# Patient Record
Sex: FEMALE | Race: WHITE | NOT HISPANIC OR LATINO
[De-identification: names, ages, dates, MRNs, and addresses within clinical notes are randomized per-mention and may not be internally consistent; named-entity substitution may affect disease eponyms.]

---

## 2022-05-03 ENCOUNTER — APPOINTMENT (OUTPATIENT)
Dept: ORTHOPEDIC SURGERY | Facility: CLINIC | Age: 82
End: 2022-05-03
Payer: MEDICARE

## 2022-05-03 VITALS — HEIGHT: 67 IN | BODY MASS INDEX: 27.47 KG/M2 | WEIGHT: 175 LBS

## 2022-05-03 DIAGNOSIS — Z87.898 PERSONAL HISTORY OF OTHER SPECIFIED CONDITIONS: ICD-10-CM

## 2022-05-03 DIAGNOSIS — Z95.5 PRESENCE OF CORONARY ANGIOPLASTY IMPLANT AND GRAFT: ICD-10-CM

## 2022-05-03 DIAGNOSIS — Z86.69 PERSONAL HISTORY OF OTHER DISEASES OF THE NERVOUS SYSTEM AND SENSE ORGANS: ICD-10-CM

## 2022-05-03 DIAGNOSIS — I25.10 ATHEROSCLEROTIC HEART DISEASE OF NATIVE CORONARY ARTERY W/OUT ANGINA PECTORIS: ICD-10-CM

## 2022-05-03 DIAGNOSIS — M25.559 PAIN IN UNSPECIFIED HIP: ICD-10-CM

## 2022-05-03 PROBLEM — Z00.00 ENCOUNTER FOR PREVENTIVE HEALTH EXAMINATION: Status: ACTIVE | Noted: 2022-05-03

## 2022-05-03 PROCEDURE — 99215 OFFICE O/P EST HI 40 MIN: CPT

## 2022-05-03 PROCEDURE — 72170 X-RAY EXAM OF PELVIS: CPT

## 2022-05-03 NOTE — HISTORY OF PRESENT ILLNESS
[Gradual] : gradual [Dull/Aching] : dull/aching [Constant] : constant [Household chores] : household chores [Leisure] : leisure [Sleep] : sleep [Social interactions] : social interactions [Nothing helps with pain getting better] : Nothing helps with pain getting better [Walking] : walking [de-identified] : 05/03/2022: 82 year F with b/l knee pain, right is worse than the left. She was previously treated by Dr. Chau with CSI, multiple rounds of HA injections, PT,HEP and medications. Treatment provided mild temporary relief. Pain is affecting her ADL and ability to walk as she is now ambulating with a wheelchair. Interested in TKA. \par  [] : no [FreeTextEntry1] : bilateral knees [FreeTextEntry5] : caregiver states pt is not new:\par \par NOTES FROM 09/2020: Patient returns for follow up B knees. Last seen in 2017 for B knee OA. She has persistent pain in her knees. She has had repeated falls due to her pain/weakness. She is using a walker. She had CSI with Dr. Arevalo 4 weeks ago with mild relief. Seeing a neurologist for her leg neuropathy. [de-identified] : 10/2020 [de-identified] : Dr. Chau [de-identified] : 2020

## 2022-05-03 NOTE — IMAGING
[de-identified] : ambulation with wheelchair\par right knee: 7-85, valgus, med and lat tenderness, mild effusion, +2 pitting edema, +1 pulse\par left knee: 7-105, varus, med and lat tenderness, mild effusion, +2 pitting edema, +1 pulse  \par \par XR pelvis- unremarkable

## 2022-05-03 NOTE — ASSESSMENT
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 I feel she is at a significant risk and explained this with her family present due to cardiac history,and neuro risk as well as memory loss. Will see cardio and neuro in the interterm and build strength with PT. Risks and benefits discussed and all questions answered\par we will plan for surgery in August after she has been cleared ans has done some PT

## 2022-05-03 NOTE — DISCUSSION/SUMMARY
[de-identified] : The natural progression of Osteoarthritis was explained to the patient.  We discussed the possible treatment options from conservative to operative.  These included NSAIDS, Glucosamine and Chondrotin sulfate, and Physical Therapy as well different types of injections.  We also discussed that at some point they may progress to needed a TKA.  Information and pamphlets were given when appropriate.\par \par Patient Complains of pain in Knee with a level that often reaches greater than a 8/10. The Pain has been\par progressively worsening of his/her treatment coarse. The pain has interfered with their ADLs and worsens with\par weight bearing. On exam they often have episodes of swelling/effusion with limited ROM. Pain worsens with ROM\par passive and active and I can palpate crepitus.\par  X rays were reviewed with the patient and they show joint space narrowing, subchondral sclerosis,\par osteophyte formation, and subchondral cysts.\par  After a period of more than 12 weeks physical therapy or exercise program done with me or another\par treating physician they have continued pain. The patient has failed a trial of NSAID medication or pain relieves if\par they were unable to tolerate NSAID medications as well as a series of injection, steroid or Hyaluronic Acid. After a\par long discussion with the patient both the patient and I have decided we have exhausted all forms of less radical\par treatments and they would like to proceed with Total Knee Replacement\par \par We discussed my findings and the natural history of their condition.  We talked about the details of the proposed surgery and the recovery.  We discussed the material risks, possible benefits and alternatives to surgery.  The risks include but are not limited to infection, bleeding and possible need for blood transfusion, fracture, bowel blockage, bladder retention or infection, need for reoperation, stiffness and/or limited range of motion, possible damage to nerves and blood vessels, failure of fixation of components, risk of deep vein thromboses and pulmonary embolism, wound healing problems, dislocation, and possible leg length discrepancy.  Although incredibly rare, we also discussed the risks of a cardiac event, stroke and even death during, or following, the surgery.  We discussed the type of implants the patient will be receiving and the type of fixation that will be used, as well as whether a robot or computer navigation aide will be used.  The patient understands they will need medical clearance and will attend a preoperative joint education class.  We also discussed the type of anesthesia they will receive, and the risks associated with hospital or rehab length of stay, obesity, diabetes and smoking.\par

## 2022-06-22 ENCOUNTER — OUTPATIENT (OUTPATIENT)
Dept: OUTPATIENT SERVICES | Facility: HOSPITAL | Age: 82
LOS: 1 days | End: 2022-06-22
Payer: MEDICARE

## 2022-06-22 ENCOUNTER — APPOINTMENT (OUTPATIENT)
Dept: CV DIAGNOSTICS | Facility: HOSPITAL | Age: 82
End: 2022-06-22

## 2022-06-22 ENCOUNTER — TRANSCRIPTION ENCOUNTER (OUTPATIENT)
Age: 82
End: 2022-06-22

## 2022-06-22 DIAGNOSIS — Z98.89 OTHER SPECIFIED POSTPROCEDURAL STATES: Chronic | ICD-10-CM

## 2022-06-22 DIAGNOSIS — I25.10 ATHEROSCLEROTIC HEART DISEASE OF NATIVE CORONARY ARTERY WITHOUT ANGINA PECTORIS: ICD-10-CM

## 2022-06-22 PROCEDURE — 93017 CV STRESS TEST TRACING ONLY: CPT

## 2022-06-22 PROCEDURE — 93018 CV STRESS TEST I&R ONLY: CPT

## 2022-06-22 PROCEDURE — A9500: CPT

## 2022-06-22 PROCEDURE — 78452 HT MUSCLE IMAGE SPECT MULT: CPT | Mod: 26,MH

## 2022-06-22 PROCEDURE — 93016 CV STRESS TEST SUPVJ ONLY: CPT

## 2022-06-22 PROCEDURE — 78452 HT MUSCLE IMAGE SPECT MULT: CPT | Mod: MH

## 2022-06-22 PROCEDURE — A9505: CPT

## 2022-06-24 ENCOUNTER — APPOINTMENT (OUTPATIENT)
Dept: CV DIAGNOSTICS | Facility: HOSPITAL | Age: 82
End: 2022-06-24

## 2022-06-27 ENCOUNTER — OUTPATIENT (OUTPATIENT)
Dept: OUTPATIENT SERVICES | Facility: HOSPITAL | Age: 82
LOS: 1 days | Discharge: HOME | End: 2022-06-27

## 2022-06-27 VITALS
HEIGHT: 67 IN | SYSTOLIC BLOOD PRESSURE: 120 MMHG | OXYGEN SATURATION: 98 % | HEART RATE: 90 BPM | TEMPERATURE: 98 F | DIASTOLIC BLOOD PRESSURE: 73 MMHG | RESPIRATION RATE: 18 BRPM | WEIGHT: 158.95 LBS

## 2022-06-27 VITALS — WEIGHT: 175.05 LBS | HEIGHT: 67 IN

## 2022-06-27 DIAGNOSIS — Z01.818 ENCOUNTER FOR OTHER PREPROCEDURAL EXAMINATION: ICD-10-CM

## 2022-06-27 DIAGNOSIS — Z98.890 OTHER SPECIFIED POSTPROCEDURAL STATES: Chronic | ICD-10-CM

## 2022-06-27 DIAGNOSIS — Z98.89 OTHER SPECIFIED POSTPROCEDURAL STATES: Chronic | ICD-10-CM

## 2022-06-27 DIAGNOSIS — Z29.9 ENCOUNTER FOR PROPHYLACTIC MEASURES, UNSPECIFIED: ICD-10-CM

## 2022-06-27 DIAGNOSIS — I25.10 ATHEROSCLEROTIC HEART DISEASE OF NATIVE CORONARY ARTERY WITHOUT ANGINA PECTORIS: ICD-10-CM

## 2022-06-27 DIAGNOSIS — M17.11 UNILATERAL PRIMARY OSTEOARTHRITIS, RIGHT KNEE: ICD-10-CM

## 2022-06-27 DIAGNOSIS — I10 ESSENTIAL (PRIMARY) HYPERTENSION: ICD-10-CM

## 2022-06-27 LAB
A1C WITH ESTIMATED AVERAGE GLUCOSE RESULT: 5.8 % — HIGH (ref 4–5.6)
ALBUMIN SERPL ELPH-MCNC: 3.4 G/DL — SIGNIFICANT CHANGE UP (ref 3.3–5)
ALP SERPL-CCNC: 109 U/L — SIGNIFICANT CHANGE UP (ref 40–120)
ALT FLD-CCNC: 17 U/L — SIGNIFICANT CHANGE UP (ref 12–78)
ANION GAP SERPL CALC-SCNC: 4 MMOL/L — LOW (ref 5–17)
APTT BLD: 29.3 SEC — SIGNIFICANT CHANGE UP (ref 27.5–35.5)
AST SERPL-CCNC: 18 U/L — SIGNIFICANT CHANGE UP (ref 15–37)
BASOPHILS # BLD AUTO: 0.03 K/UL — SIGNIFICANT CHANGE UP (ref 0–0.2)
BASOPHILS NFR BLD AUTO: 0.3 % — SIGNIFICANT CHANGE UP (ref 0–2)
BILIRUB SERPL-MCNC: 0.8 MG/DL — SIGNIFICANT CHANGE UP (ref 0.2–1.2)
BLD GP AB SCN SERPL QL: SIGNIFICANT CHANGE UP
BUN SERPL-MCNC: 9 MG/DL — SIGNIFICANT CHANGE UP (ref 7–23)
CALCIUM SERPL-MCNC: 9.2 MG/DL — SIGNIFICANT CHANGE UP (ref 8.5–10.1)
CHLORIDE SERPL-SCNC: 108 MMOL/L — SIGNIFICANT CHANGE UP (ref 96–108)
CO2 SERPL-SCNC: 33 MMOL/L — HIGH (ref 22–31)
CREAT SERPL-MCNC: 0.84 MG/DL — SIGNIFICANT CHANGE UP (ref 0.5–1.3)
EGFR: 70 ML/MIN/1.73M2 — SIGNIFICANT CHANGE UP
EOSINOPHIL # BLD AUTO: 0.05 K/UL — SIGNIFICANT CHANGE UP (ref 0–0.5)
EOSINOPHIL NFR BLD AUTO: 0.5 % — SIGNIFICANT CHANGE UP (ref 0–6)
ESTIMATED AVERAGE GLUCOSE: 120 MG/DL — HIGH (ref 68–114)
GLUCOSE SERPL-MCNC: 104 MG/DL — HIGH (ref 70–99)
HCT VFR BLD CALC: 45.4 % — HIGH (ref 34.5–45)
HGB BLD-MCNC: 14.6 G/DL — SIGNIFICANT CHANGE UP (ref 11.5–15.5)
IMM GRANULOCYTES NFR BLD AUTO: 0.2 % — SIGNIFICANT CHANGE UP (ref 0–1.5)
INR BLD: 0.98 RATIO — SIGNIFICANT CHANGE UP (ref 0.88–1.16)
LYMPHOCYTES # BLD AUTO: 1.68 K/UL — SIGNIFICANT CHANGE UP (ref 1–3.3)
LYMPHOCYTES # BLD AUTO: 17.9 % — SIGNIFICANT CHANGE UP (ref 13–44)
MCHC RBC-ENTMCNC: 30.3 PG — SIGNIFICANT CHANGE UP (ref 27–34)
MCHC RBC-ENTMCNC: 32.2 G/DL — SIGNIFICANT CHANGE UP (ref 32–36)
MCV RBC AUTO: 94.2 FL — SIGNIFICANT CHANGE UP (ref 80–100)
MONOCYTES # BLD AUTO: 0.74 K/UL — SIGNIFICANT CHANGE UP (ref 0–0.9)
MONOCYTES NFR BLD AUTO: 7.9 % — SIGNIFICANT CHANGE UP (ref 2–14)
NEUTROPHILS # BLD AUTO: 6.86 K/UL — SIGNIFICANT CHANGE UP (ref 1.8–7.4)
NEUTROPHILS NFR BLD AUTO: 73.2 % — SIGNIFICANT CHANGE UP (ref 43–77)
NRBC # BLD: 0 /100 WBCS — SIGNIFICANT CHANGE UP (ref 0–0)
PLATELET # BLD AUTO: 332 K/UL — SIGNIFICANT CHANGE UP (ref 150–400)
POTASSIUM SERPL-MCNC: 3.7 MMOL/L — SIGNIFICANT CHANGE UP (ref 3.5–5.3)
POTASSIUM SERPL-SCNC: 3.7 MMOL/L — SIGNIFICANT CHANGE UP (ref 3.5–5.3)
PROT SERPL-MCNC: 7.4 GM/DL — SIGNIFICANT CHANGE UP (ref 6–8.3)
PROTHROM AB SERPL-ACNC: 11.8 SEC — SIGNIFICANT CHANGE UP (ref 10.5–13.4)
RBC # BLD: 4.82 M/UL — SIGNIFICANT CHANGE UP (ref 3.8–5.2)
RBC # FLD: 13.6 % — SIGNIFICANT CHANGE UP (ref 10.3–14.5)
SODIUM SERPL-SCNC: 145 MMOL/L — SIGNIFICANT CHANGE UP (ref 135–145)
VIT D25+D1,25 OH+D1,25 PNL SERPL-MCNC: 51.4 PG/ML — SIGNIFICANT CHANGE UP (ref 19.9–79.3)
WBC # BLD: 9.38 K/UL — SIGNIFICANT CHANGE UP (ref 3.8–10.5)
WBC # FLD AUTO: 9.38 K/UL — SIGNIFICANT CHANGE UP (ref 3.8–10.5)

## 2022-06-27 NOTE — PHYSICAL THERAPY INITIAL EVALUATION ADULT - DISCHARGE DISPOSITION, PT EVAL
Patient prefers Short Term Rehab, pending functional status post-operatively. Will require rolling walker. Owns rollator, 3:1 commode, recliner chair, transport chair, and shower chair.

## 2022-06-27 NOTE — H&P PST ADULT - ASSESSMENT
CHRISTOPHERI VTE 2.0 SCORE [CLOT updated 2019]    AGE RELATED RISK FACTORS                                                       MOBILITY RELATED FACTORS  [ ] Age 41-60 years                                            (1 Point)                    [ ] Bed rest                                                        (1 Point)  [ ] Age: 61-74 years                                           (2 Points)                  [ ] Plaster cast                                                   (2 Points)  [x ] Age= 75 years                                              (3 Points)                    [ ] Bed bound for more than 72 hours                 (2 Points)    DISEASE RELATED RISK FACTORS                                               GENDER SPECIFIC FACTORS  [ ] Edema in the lower extremities                       (1 Point)              [ ] Pregnancy                                                     (1 Point)  [ ] Varicose veins                                               (1 Point)                     [ ] Post-partum < 6 weeks                                   (1 Point)             [ ] BMI > 25 Kg/m2                                            (1 Point)                     [ ] Hormonal therapy  or oral contraception          (1 Point)                 [ ] Sepsis (in the previous month)                        (1 Point)               [ ] History of pregnancy complications                 (1 point)  [ ] Pneumonia or serious lung disease                                               [ ] Unexplained or recurrent                     (1 Point)           (in the previous month)                               (1 Point)  [ ] Abnormal pulmonary function test                     (1 Point)                 SURGERY RELATED RISK FACTORS  [ ] Acute myocardial infarction                              (1 Point)               [ ]  Section                                             (1 Point)  [ ] Congestive heart failure (in the previous month)  (1 Point)      [ ] Minor surgery                                                  (1 Point)   [ ] Inflammatory bowel disease                             (1 Point)               [ ] Arthroscopic surgery                                        (2 Points)  [ ] Central venous access                                      (2 Points)                [ ] General surgery lasting more than 45 minutes (2 points)  [ ] Malignancy- Present or previous                   (2 Points)                [ x] Elective arthroplasty                                         (5 points)    [ ] Stroke (in the previous month)                          (5 Points)                                                                                                                                                           HEMATOLOGY RELATED FACTORS                                                 TRAUMA RELATED RISK FACTORS  [ ] Prior episodes of VTE                                     (3 Points)                [ ] Fracture of the hip, pelvis, or leg                       (5 Points)  [ ] Positive family history for VTE                         (3 Points)             [ ] Acute spinal cord injury (in the previous month)  (5 Points)  [ ] Prothrombin 99958 A                                     (3 Points)               [ ] Paralysis  (less than 1 month)                             (5 Points)  [ ] Factor V Leiden                                             (3 Points)                  [ ] Multiple Trauma within 1 month                        (5 Points)  [ ] Lupus anticoagulants                                     (3 Points)                                                           [ ] Anticardiolipin antibodies                               (3 Points)                                                       [ ] High homocysteine in the blood                      (3 Points)                                             [ ] Other congenital or acquired thrombophilia      (3 Points)                                                [ ] Heparin induced thrombocytopenia                  (3 Points)                                     Total Score [     8     ]

## 2022-06-27 NOTE — H&P PST ADULT - NSICDXPASTSURGICALHX_GEN_ALL_CORE_FT
PAST SURGICAL HISTORY:  History of cholecystectomy     S/P cardiac catheterization 2018 with CASEY    S/P tonsillectomy

## 2022-06-27 NOTE — OCCUPATIONAL THERAPY INITIAL EVALUATION ADULT - LIVES WITH, PROFILE
in a private house with 3 steps equipped with a ramp and bilateral hand rails that cannot be reached simultaneously . All living amenities are located on one level. The bathroom has a tub/shower combination, shower chair, fixed / retractable shower head and standard toilet/ 3:1commode ./spouse in a private house with 3 steps equipped with a ramp over them and bilateral hand rails that can be reached simultaneously . All living amenities are located on one level. The bathroom has a tub/shower combination, shower chair, fixed / retractable shower head and standard toilet/ 3:1commode ./spouse

## 2022-06-27 NOTE — OCCUPATIONAL THERAPY INITIAL EVALUATION ADULT - SOCIAL CONCERNS
Pt's spouse and daughter Sangeeta  voiced concerns about her recovery at home jose to limited support. Both are opting for short term rehab and intend to discuss this MD Waldemar. ./Complex psychosocial needs/coping issues Pt's spouse and daughter Sangeeta voiced concerns about her recovery at home due to limited support. Both are opting for short term rehab and intend to discuss this MD Waldemar. ./Complex psychosocial needs/coping issues

## 2022-06-27 NOTE — PHYSICAL THERAPY INITIAL EVALUATION ADULT - SINGLE LEG BALANCE TEST, REHAB EVAL
One Leg Stand Test (OLST): Right: unable Left: unable. Functional test to assess fall risk. Both gait and stair negotiation require components of OLST. Participants unable to perform the OLST for at least 5 seconds are at increased risk for injurious fall.

## 2022-06-27 NOTE — OCCUPATIONAL THERAPY INITIAL EVALUATION ADULT - PERTINENT HX OF CURRENT PROBLEM, REHAB EVAL
Pt is and 81 y/o female slated for elective surgery for right TKR with MD Medeiros on 7/14/22, due to OA, chronic pain and DJD. Pt denied any falls in the past 3-6 months

## 2022-06-27 NOTE — OCCUPATIONAL THERAPY INITIAL EVALUATION ADULT - TRANSFER SAFETY CONCERNS NOTED: BED/CHAIR, REHAB EVAL
decreased balance during turns/decreased safety awareness/squat pivot/stand pivot/decreased weight-shifting ability

## 2022-06-27 NOTE — H&P PST ADULT - NSANTHOSAYNRD_GEN_A_CORE
No. GODWIN screening performed.  STOP BANG Legend: 0-2 = LOW Risk; 3-4 = INTERMEDIATE Risk; 5-8 = HIGH Risk

## 2022-06-27 NOTE — PHYSICAL THERAPY INITIAL EVALUATION ADULT - TIMED UP AND GO TEST, REHAB EVAL
>2 minutes with rolling walker. (An older adult who takes =12 seconds to complete the TUG is at risk for falling, per CDC’s STEADI tools, 2017)

## 2022-06-27 NOTE — OCCUPATIONAL THERAPY INITIAL EVALUATION ADULT - ADDITIONAL COMMENTS
At this time is functioning in her roles, & ambulating independently for short household distance to the bathroom. Pt's spouse assist her with bathing, dressing shopping cooking and cleaning. Pt c/o 5/10 pain in her right knee at rest and 10/10. The pain is exacerbated, by walking, prolonged sitting, positioning,  jolts when her cats and dogs jump on her; pain is relieved with heat and hydrocodone 1/2 twice daily. Pt is unable to make a composite on each hand due to arthritic changes; as a resolut dexterity and fine motor skills are diminished.  Pt is currently receiving outpatient PT intervention 2x weekly. Pt is right hand dominant and wears glasses for reading. Pt scores 60% of patient specific scale At this time is functioning in her roles, & ambulating independently for short household distance to the bathroom. Pt's spouse assist her with bathing, dressing shopping cooking and cleaning. Pt c/o 5/10 pain in her right knee at rest and 10/10. The pain is exacerbated, by walking, prolonged sitting, positioning,  jolts when her cats and dogs jump on her; pain is relieved with heat and hydrocodone 1/2 twice daily. Pt is unable to make a composite on each hand due to arthritic changes; as a result ,dexterity and fine motor skills are diminished.  Pt is currently receiving outpatient PT intervention 2x weekly. Pt is right hand dominant and wears glasses for reading. Pt scores 60% of patient specific scale

## 2022-06-27 NOTE — OCCUPATIONAL THERAPY INITIAL EVALUATION ADULT - GENERAL OBSERVATIONS, REHAB EVAL
Chart reviewed. Patient encountered seated in transport wheel chair in rehab preop room in [facility]. Pt is accompanied by her  and daughter Sangeeta. Patient underwent occupational therapy pre-operative consultation to determine current functional ADL limitations in order to provide the right equipment for patient to perform functional ADL post operation.

## 2022-06-27 NOTE — PHYSICAL THERAPY INITIAL EVALUATION ADULT - ADDITIONAL COMMENTS
Home set-up: lives with spouse in private house with 3 stair steps with bilateral handrails, and a ramp to enter at front. Bedroom and bathroom at same level. Bathroom is a shower-tub combination with grab rails, a standard height toilet seat, with fixed shower head. Endorses will be supported by  post-op. Patient is currently using rollator with mobility. Reports owns a rollator, commode, recliner chair and transport chair. Patient endorses typical pain at best is 5/10, at worst is 10/10. Per patient, pain is worse with positioning. Pain is relieved by taking hydrocodone once a day; rests or use of heat. Patient is (R)-handed and no longer drives; wears glasses always. Patient denies falls in past 6 months. Denies buckling.

## 2022-06-27 NOTE — OCCUPATIONAL THERAPY INITIAL EVALUATION ADULT - RANGE OF MOTION EXAMINATION, LOWER EXTREMITY
ROM is limited in right knee due to pain . ROM in right knee is -25 -90/bilateral LE Passive ROM was WFL  (within functional limits)/bilateral LE Active Assistive ROM was WFL  (within functional limits)

## 2022-06-27 NOTE — OCCUPATIONAL THERAPY INITIAL EVALUATION ADULT - ANTICIPATED DISCHARGE DISPOSITION, OT EVAL
Recommend home with OT referral to enable patient to safely perform ADL management and functional mobility.  Pt's  and ashley Rutherford expressed preferences for pt to go to [facility] due to limited support at home. Pt owns a rollator 3-in-1 commode, rolling walker and shower chair.

## 2022-06-27 NOTE — OCCUPATIONAL THERAPY INITIAL EVALUATION ADULT - PRECAUTIONS/LIMITATIONS, REHAB EVAL
Pt has a history of multiple comorbidities and diminished reaction time due to age related changes . Pt's mobility and ADL management is limited due to pain in right knee. Pt is unable to bend right knee and her gait is unsteady./cardiac precautions/fall precautions/obesity precautions

## 2022-06-27 NOTE — H&P PST ADULT - PROBLEM SELECTOR PLAN 2
brilinta pre op instruction per cardiologist , daughter will call cardiologist for instructions  pt already seen cardiologist for clearance, will obtain the report brilinta pre op instruction per cardiologist , daughter will call cardiologist for instructions  pt already seen cardiologist for clearance, will obtain the report  stress test report 6/2022 in chart

## 2022-06-27 NOTE — OCCUPATIONAL THERAPY INITIAL EVALUATION ADULT - ASSISTIVE DEVICE FOR TOILET TRANSFER, REHAB EVAL
February 19, 2020      Jeannine Brizuela PA-C  [address] [facility] [address]           [facility] [address] [place] [address]  Phone: [phone]          Patient: Gus Recinos   MR Number: [id number]   YOB: 2007   Date of Visit: 2/19/2020       Dear Jeannine Brizuela:    Thank you for referring Gus Recinos to me for evaluation. Attached you will find relevant portions of my assessment and plan of care.    If you have questions, please do not hesitate to call me. I look forward to following Gus Recinos along with you.    Sincerely,    Gabriel Antonio MD    Enclosure  CC:  No Recipients    If you would like to receive this communication electronically, please contact [email or web address] or [phone] to request more information on [employer] access.    For providers and/or their staff who would like to refer a patient to [facility], please contact us through our one-stop-shop provider referral line, [facility], at [phone].    If you feel you have received this communication in error or would no longer like to receive these types of communications, please e-mail [email or web address]          commode/rolling walker

## 2022-06-27 NOTE — PHYSICAL THERAPY INITIAL EVALUATION ADULT - PERTINENT HX OF CURRENT PROBLEM, REHAB EVAL
Patient attends Pre-Op Testing today following consult with Dr. Medeiros due to chronic pain to (R) knee. Significant surgical/medical histories of cardiac stents and (R) rotator cuff repair. Elective  RTKA is now scheduled in this facility for 7/14/22.

## 2022-06-27 NOTE — H&P PST ADULT - HISTORY OF PRESENT ILLNESS
This is a 81 y/o female with PMH of CAD, with CASEY on brilinta, HTN, HLD, GERD, dementia, glaucoma , c/o both knee pain, right worse than left , associated with difficulty walking, she presents today for right total knee arthroplasty on 7/14/22 This is a 83 y/o female with PMH of CAD, with CASEY on brilinta, HTN, HLD, GERD, dementia, glaucoma , c/o both knee pain, right worse than left , associated with difficulty walking, she presents today for right total knee arthroplasty on 7/14/22  covid swab to be done 7/11/22 , daughter will make appointment for pt   denies recent travel or covid infections This is a 83 y/o female with PMH of CAD, with CASEY on brilinta, HTN, HLD, GERD, dementia, glaucoma , c/o both knee pain, right worse than left , associated with difficulty walking, she presents today for right total knee arthroplasty on 7/14/22  covid swab to be done 7/11/22 , daughter will make appointment for pt   denies recent travel or covid infections  goals: pain free, walk better

## 2022-06-27 NOTE — PHYSICAL THERAPY INITIAL EVALUATION ADULT - GAIT DEVIATIONS NOTED, PT EVAL
antalgic; (R) knee locked in extension during gait/decreased ebenezer/increased time in double stance/decreased step length/decreased stride length/decreased weight-shifting ability

## 2022-06-27 NOTE — OCCUPATIONAL THERAPY INITIAL EVALUATION ADULT - RANGE OF MOTION EXAMINATION, UPPER EXTREMITY
ROM in right shoulder 0-100, left shoulder 0-100/bilateral UE Passive ROM was WFL  (within functional limits)/bilateral UE Active Assistive ROM was WFL  (within functional limits)

## 2022-06-27 NOTE — PHYSICAL THERAPY INITIAL EVALUATION ADULT - GENERAL OBSERVATIONS, REHAB EVAL
Patient encountered sitting in PST waiting area, agreeable to PT pre-op evaluation. Patient is for elective (R) total knee arthroplasty at a later date from now. Tolerated all aspects of evaluation without undue events, please see further PT documentation for details.

## 2022-06-27 NOTE — H&P PST ADULT - NSICDXPASTMEDICALHX_GEN_ALL_CORE_FT
PAST MEDICAL HISTORY:  Anxiety and depression     Arthritis     CAD (coronary artery disease)     Cataract     Dementia     GERD (gastroesophageal reflux disease)     Glaucoma     HTN (hypertension)     Hyperlipidemia     Mitral valve prolapse     Pancreatitis     Polymyalgia rheumatica

## 2022-06-28 ENCOUNTER — APPOINTMENT (OUTPATIENT)
Dept: ORTHOPEDIC SURGERY | Facility: CLINIC | Age: 82
End: 2022-06-28
Payer: MEDICARE

## 2022-06-28 VITALS — WEIGHT: 175 LBS | BODY MASS INDEX: 27.47 KG/M2 | HEIGHT: 67 IN

## 2022-06-28 DIAGNOSIS — M17.12 UNILATERAL PRIMARY OSTEOARTHRITIS, LEFT KNEE: ICD-10-CM

## 2022-06-28 LAB
MRSA PCR RESULT.: SIGNIFICANT CHANGE UP
S AUREUS DNA NOSE QL NAA+PROBE: SIGNIFICANT CHANGE UP

## 2022-06-28 PROCEDURE — 99213 OFFICE O/P EST LOW 20 MIN: CPT

## 2022-06-28 NOTE — ASSESSMENT
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 I feel she is at a significant risk and explained this with her family present due to cardiac history,and neuro risk as well as memory loss. Will see cardio and neuro in the interterm and build strength with PT. Risks and benefits discussed and all questions answered\par we will plan for surgery in August after she has been cleared ans has done some PT \par \par 6/28/22: continued pain in b/l knees - She is scheduled for R TKA 7/14/22. Has completed preop testing. All questions answered and risks and benefits discussed. Again discussed issues with neuro and ambulation status with patient and her family and they are aware and understand.

## 2022-06-28 NOTE — HISTORY OF PRESENT ILLNESS
[Gradual] : gradual [Dull/Aching] : dull/aching [Constant] : constant [Household chores] : household chores [Leisure] : leisure [Sleep] : sleep [Social interactions] : social interactions [Nothing helps with pain getting better] : Nothing helps with pain getting better [Walking] : walking [de-identified] : 6/28/22: continued and worsening pain in b/l knees. Scheduled for R TKA 7/14/22\par \par Prev doc;\par 05/03/2022: 82 year F with b/l knee pain, right is worse than the left. She was previously treated by Dr. Chau with CSI, multiple rounds of HA injections, PT,HEP and medications. Treatment provided mild temporary relief. Pain is affecting her ADL and ability to walk as she is now ambulating with a wheelchair. Interested in TKA. \par  [] : no [FreeTextEntry1] : bilateral knees [FreeTextEntry5] : NOTES FROM 09/2020: Patient returns for follow up B knees. Last seen in 2017 for B knee OA. She has persistent pain in her knees. She has had repeated falls due to her pain/weakness. She is using a walker. She had CSI with Dr. Arevalo 4 weeks ago with mild relief. Seeing a neurologist for her leg neuropathy.\par \par 06/28/22: pt reports pain is the same since last visit. \par pt had pre-surgical testing yesterday 06/27/22 and nuclear stress test  [de-identified] : 10/2020 [de-identified] : Dr. Chau

## 2022-06-28 NOTE — DISCUSSION/SUMMARY
[de-identified] : The natural progression of Osteoarthritis was explained to the patient.  We discussed the possible treatment options from conservative to operative.  These included NSAIDS, Glucosamine and Chondrotin sulfate, and Physical Therapy as well different types of injections.  We also discussed that at some point they may progress to needed a TKA.  Information and pamphlets were given when appropriate.\par \par Patient Complains of pain in Knee with a level that often reaches greater than a 8/10. The Pain has been\par progressively worsening of his/her treatment coarse. The pain has interfered with their ADLs and worsens with\par weight bearing. On exam they often have episodes of swelling/effusion with limited ROM. Pain worsens with ROM\par passive and active and I can palpate crepitus.\par  X rays were reviewed with the patient and they show joint space narrowing, subchondral sclerosis,\par osteophyte formation, and subchondral cysts.\par  After a period of more than 12 weeks physical therapy or exercise program done with me or another\par treating physician they have continued pain. The patient has failed a trial of NSAID medication or pain relieves if\par they were unable to tolerate NSAID medications as well as a series of injection, steroid or Hyaluronic Acid. After a\par long discussion with the patient both the patient and I have decided we have exhausted all forms of less radical\par treatments and they would like to proceed with Total Knee Replacement\par \par We discussed my findings and the natural history of their condition.  We talked about the details of the proposed surgery and the recovery.  We discussed the material risks, possible benefits and alternatives to surgery.  The risks include but are not limited to infection, bleeding and possible need for blood transfusion, fracture, bowel blockage, bladder retention or infection, need for reoperation, stiffness and/or limited range of motion, possible damage to nerves and blood vessels, failure of fixation of components, risk of deep vein thromboses and pulmonary embolism, wound healing problems, dislocation, and possible leg length discrepancy.  Although incredibly rare, we also discussed the risks of a cardiac event, stroke and even death during, or following, the surgery.  We discussed the type of implants the patient will be receiving and the type of fixation that will be used, as well as whether a robot or computer navigation aide will be used.  The patient understands they will need medical clearance and will attend a preoperative joint education class.  We also discussed the type of anesthesia they will receive, and the risks associated with hospital or rehab length of stay, obesity, diabetes and smoking.\par \par Entered by Haritha Taylor acting as scribe.

## 2022-06-28 NOTE — IMAGING
[de-identified] : ambulation with wheelchair\par right knee: 7-85, valgus, med and lat tenderness, mild effusion, +2 pitting edema, +1 pulse\par left knee: 7-105, varus, med and lat tenderness, mild effusion, +2 pitting edema, +1 pulse  \par \par

## 2022-07-13 ENCOUNTER — FORM ENCOUNTER (OUTPATIENT)
Age: 82
End: 2022-07-13

## 2022-07-14 ENCOUNTER — TRANSCRIPTION ENCOUNTER (OUTPATIENT)
Age: 82
End: 2022-07-14

## 2022-07-14 ENCOUNTER — OUTPATIENT (OUTPATIENT)
Dept: OUTPATIENT SERVICES | Facility: HOSPITAL | Age: 82
LOS: 1 days | Discharge: HOME | End: 2022-07-14

## 2022-07-14 ENCOUNTER — APPOINTMENT (OUTPATIENT)
Dept: ORTHOPEDIC SURGERY | Facility: HOSPITAL | Age: 82
End: 2022-07-14

## 2022-07-14 DIAGNOSIS — F02.80 DEMENTIA IN OTHER DISEASES CLASSIFIED ELSEWHERE, UNSPECIFIED SEVERITY, WITHOUT BEHAVIORAL DISTURBANCE, PSYCHOTIC DISTURBANCE, MOOD DISTURBANCE, AND ANXIETY: ICD-10-CM

## 2022-07-14 DIAGNOSIS — I25.10 ATHEROSCLEROTIC HEART DISEASE OF NATIVE CORONARY ARTERY WITHOUT ANGINA PECTORIS: ICD-10-CM

## 2022-07-14 DIAGNOSIS — M17.11 UNILATERAL PRIMARY OSTEOARTHRITIS, RIGHT KNEE: ICD-10-CM

## 2022-07-14 DIAGNOSIS — F41.9 ANXIETY DISORDER, UNSPECIFIED: ICD-10-CM

## 2022-07-14 DIAGNOSIS — I12.9 HYPERTENSIVE CHRONIC KIDNEY DISEASE WITH STAGE 1 THROUGH STAGE 4 CHRONIC KIDNEY DISEASE, OR UNSPECIFIED CHRONIC KIDNEY DISEASE: ICD-10-CM

## 2022-07-14 DIAGNOSIS — Z90.49 ACQUIRED ABSENCE OF OTHER SPECIFIED PARTS OF DIGESTIVE TRACT: ICD-10-CM

## 2022-07-14 DIAGNOSIS — Z79.82 LONG TERM (CURRENT) USE OF ASPIRIN: ICD-10-CM

## 2022-07-14 DIAGNOSIS — Z95.5 PRESENCE OF CORONARY ANGIOPLASTY IMPLANT AND GRAFT: ICD-10-CM

## 2022-07-14 DIAGNOSIS — I34.1 NONRHEUMATIC MITRAL (VALVE) PROLAPSE: ICD-10-CM

## 2022-07-14 DIAGNOSIS — Z98.89 OTHER SPECIFIED POSTPROCEDURAL STATES: Chronic | ICD-10-CM

## 2022-07-14 DIAGNOSIS — J44.9 CHRONIC OBSTRUCTIVE PULMONARY DISEASE, UNSPECIFIED: ICD-10-CM

## 2022-07-14 DIAGNOSIS — Z88.0 ALLERGY STATUS TO PENICILLIN: ICD-10-CM

## 2022-07-14 DIAGNOSIS — G30.9 ALZHEIMER'S DISEASE, UNSPECIFIED: ICD-10-CM

## 2022-07-14 DIAGNOSIS — E78.00 PURE HYPERCHOLESTEROLEMIA, UNSPECIFIED: ICD-10-CM

## 2022-07-14 DIAGNOSIS — K21.9 GASTRO-ESOPHAGEAL REFLUX DISEASE WITHOUT ESOPHAGITIS: ICD-10-CM

## 2022-07-14 DIAGNOSIS — Z98.890 OTHER SPECIFIED POSTPROCEDURAL STATES: Chronic | ICD-10-CM

## 2022-07-14 DIAGNOSIS — F32.A DEPRESSION, UNSPECIFIED: ICD-10-CM

## 2022-07-14 DIAGNOSIS — N18.2 CHRONIC KIDNEY DISEASE, STAGE 2 (MILD): ICD-10-CM

## 2022-07-14 DIAGNOSIS — M19.90 UNSPECIFIED OSTEOARTHRITIS, UNSPECIFIED SITE: ICD-10-CM

## 2022-07-14 DIAGNOSIS — M06.80 OTHER SPECIFIED RHEUMATOID ARTHRITIS, UNSPECIFIED SITE: ICD-10-CM

## 2022-07-14 PROCEDURE — 20985 CPTR-ASST DIR MS PX: CPT

## 2022-07-14 PROCEDURE — 27447 TOTAL KNEE ARTHROPLASTY: CPT | Mod: AS,RT

## 2022-07-14 PROCEDURE — 27447 TOTAL KNEE ARTHROPLASTY: CPT | Mod: RT

## 2022-07-14 RX ORDER — BETAXOLOL HCL 0.25 %
1 SUSPENSION, DROPS(FINAL DOSAGE FORM)(ML) OPHTHALMIC (EYE)
Qty: 0 | Refills: 0 | DISCHARGE

## 2022-07-14 RX ORDER — ATORVASTATIN CALCIUM 80 MG/1
1 TABLET, FILM COATED ORAL
Qty: 0 | Refills: 0 | DISCHARGE

## 2022-07-14 RX ORDER — BRINZOLAMIDE/BRIMONIDINE TARTRATE 10; 2 MG/ML; MG/ML
1 SUSPENSION/ DROPS OPHTHALMIC
Qty: 0 | Refills: 0 | DISCHARGE

## 2022-07-14 RX ORDER — DONEPEZIL HYDROCHLORIDE 10 MG/1
1 TABLET, FILM COATED ORAL
Qty: 0 | Refills: 0 | DISCHARGE

## 2022-07-14 RX ORDER — ALPRAZOLAM 0.25 MG
1 TABLET ORAL
Qty: 0 | Refills: 0 | DISCHARGE

## 2022-07-14 RX ORDER — DULOXETINE HYDROCHLORIDE 30 MG/1
1 CAPSULE, DELAYED RELEASE ORAL
Qty: 0 | Refills: 0 | DISCHARGE

## 2022-07-15 ENCOUNTER — TRANSCRIPTION ENCOUNTER (OUTPATIENT)
Age: 82
End: 2022-07-15

## 2022-07-15 RX ORDER — FOLIC ACID 0.8 MG
1 TABLET ORAL
Qty: 0 | Refills: 0 | DISCHARGE

## 2022-07-19 PROBLEM — F03.90 UNSPECIFIED DEMENTIA WITHOUT BEHAVIORAL DISTURBANCE: Chronic | Status: ACTIVE | Noted: 2022-06-27

## 2022-07-19 PROBLEM — I25.10 ATHEROSCLEROTIC HEART DISEASE OF NATIVE CORONARY ARTERY WITHOUT ANGINA PECTORIS: Chronic | Status: ACTIVE | Noted: 2022-06-27

## 2022-07-19 PROBLEM — E78.5 HYPERLIPIDEMIA, UNSPECIFIED: Chronic | Status: ACTIVE | Noted: 2022-06-27

## 2022-07-19 PROBLEM — F41.9 ANXIETY DISORDER, UNSPECIFIED: Chronic | Status: ACTIVE | Noted: 2022-06-27

## 2022-07-25 ENCOUNTER — APPOINTMENT (OUTPATIENT)
Dept: ORTHOPEDIC SURGERY | Facility: CLINIC | Age: 82
End: 2022-07-25

## 2022-08-04 ENCOUNTER — INPATIENT (INPATIENT)
Facility: HOSPITAL | Age: 82
LOS: 4 days | Discharge: HOME HEALTH | End: 2022-08-09
Attending: FAMILY MEDICINE | Admitting: FAMILY MEDICINE

## 2022-08-04 VITALS
WEIGHT: 139.99 LBS | OXYGEN SATURATION: 96 % | DIASTOLIC BLOOD PRESSURE: 57 MMHG | TEMPERATURE: 98 F | HEIGHT: 67 IN | RESPIRATION RATE: 18 BRPM | HEART RATE: 100 BPM | SYSTOLIC BLOOD PRESSURE: 77 MMHG

## 2022-08-04 DIAGNOSIS — Z98.89 OTHER SPECIFIED POSTPROCEDURAL STATES: Chronic | ICD-10-CM

## 2022-08-04 DIAGNOSIS — Z98.890 OTHER SPECIFIED POSTPROCEDURAL STATES: Chronic | ICD-10-CM

## 2022-08-04 LAB
ALBUMIN SERPL ELPH-MCNC: 2.9 G/DL — LOW (ref 3.3–5)
ALP SERPL-CCNC: 136 U/L — HIGH (ref 40–120)
ALT FLD-CCNC: 25 U/L — SIGNIFICANT CHANGE UP (ref 12–78)
ANION GAP SERPL CALC-SCNC: 9 MMOL/L — SIGNIFICANT CHANGE UP (ref 5–17)
APPEARANCE UR: ABNORMAL
APTT BLD: 28 SEC — SIGNIFICANT CHANGE UP (ref 27.5–35.5)
AST SERPL-CCNC: 25 U/L — SIGNIFICANT CHANGE UP (ref 15–37)
BACTERIA # UR AUTO: ABNORMAL
BASOPHILS # BLD AUTO: 0.05 K/UL — SIGNIFICANT CHANGE UP (ref 0–0.2)
BASOPHILS NFR BLD AUTO: 0.4 % — SIGNIFICANT CHANGE UP (ref 0–2)
BILIRUB SERPL-MCNC: 0.9 MG/DL — SIGNIFICANT CHANGE UP (ref 0.2–1.2)
BILIRUB UR-MCNC: NEGATIVE — SIGNIFICANT CHANGE UP
BUN SERPL-MCNC: 39 MG/DL — HIGH (ref 7–23)
CALCIUM SERPL-MCNC: 9 MG/DL — SIGNIFICANT CHANGE UP (ref 8.5–10.1)
CHLORIDE SERPL-SCNC: 101 MMOL/L — SIGNIFICANT CHANGE UP (ref 96–108)
CO2 SERPL-SCNC: 30 MMOL/L — SIGNIFICANT CHANGE UP (ref 22–31)
COLOR SPEC: YELLOW — SIGNIFICANT CHANGE UP
CREAT SERPL-MCNC: 1.59 MG/DL — HIGH (ref 0.5–1.3)
D DIMER BLD IA.RAPID-MCNC: 1110 NG/ML DDU — HIGH
DIFF PNL FLD: ABNORMAL
EGFR: 33 ML/MIN/1.73M2 — LOW
EOSINOPHIL # BLD AUTO: 0.08 K/UL — SIGNIFICANT CHANGE UP (ref 0–0.5)
EOSINOPHIL NFR BLD AUTO: 0.7 % — SIGNIFICANT CHANGE UP (ref 0–6)
EPI CELLS # UR: ABNORMAL
FLUAV AG NPH QL: SIGNIFICANT CHANGE UP
FLUBV AG NPH QL: SIGNIFICANT CHANGE UP
GLUCOSE BLDC GLUCOMTR-MCNC: 137 MG/DL — HIGH (ref 70–99)
GLUCOSE SERPL-MCNC: 129 MG/DL — HIGH (ref 70–99)
GLUCOSE UR QL: NEGATIVE MG/DL — SIGNIFICANT CHANGE UP
HCT VFR BLD CALC: 32.8 % — LOW (ref 34.5–45)
HGB BLD-MCNC: 10.7 G/DL — LOW (ref 11.5–15.5)
IMM GRANULOCYTES NFR BLD AUTO: 1 % — SIGNIFICANT CHANGE UP (ref 0–1.5)
INR BLD: 0.99 RATIO — SIGNIFICANT CHANGE UP (ref 0.88–1.16)
KETONES UR-MCNC: ABNORMAL
LACTATE SERPL-SCNC: 1.3 MMOL/L — SIGNIFICANT CHANGE UP (ref 0.7–2)
LACTATE SERPL-SCNC: 5.2 MMOL/L — CRITICAL HIGH (ref 0.7–2)
LEUKOCYTE ESTERASE UR-ACNC: ABNORMAL
LYMPHOCYTES # BLD AUTO: 0.8 K/UL — LOW (ref 1–3.3)
LYMPHOCYTES # BLD AUTO: 6.8 % — LOW (ref 13–44)
MCHC RBC-ENTMCNC: 31.5 PG — SIGNIFICANT CHANGE UP (ref 27–34)
MCHC RBC-ENTMCNC: 32.6 G/DL — SIGNIFICANT CHANGE UP (ref 32–36)
MCV RBC AUTO: 96.5 FL — SIGNIFICANT CHANGE UP (ref 80–100)
MONOCYTES # BLD AUTO: 0.88 K/UL — SIGNIFICANT CHANGE UP (ref 0–0.9)
MONOCYTES NFR BLD AUTO: 7.5 % — SIGNIFICANT CHANGE UP (ref 2–14)
NEUTROPHILS # BLD AUTO: 9.86 K/UL — HIGH (ref 1.8–7.4)
NEUTROPHILS NFR BLD AUTO: 83.6 % — HIGH (ref 43–77)
NITRITE UR-MCNC: NEGATIVE — SIGNIFICANT CHANGE UP
NRBC # BLD: 0 /100 WBCS — SIGNIFICANT CHANGE UP (ref 0–0)
NT-PROBNP SERPL-SCNC: 375 PG/ML — SIGNIFICANT CHANGE UP (ref 0–450)
PH UR: 5 — SIGNIFICANT CHANGE UP (ref 5–8)
PLATELET # BLD AUTO: 923 K/UL — HIGH (ref 150–400)
POTASSIUM SERPL-MCNC: 4.1 MMOL/L — SIGNIFICANT CHANGE UP (ref 3.5–5.3)
POTASSIUM SERPL-SCNC: 4.1 MMOL/L — SIGNIFICANT CHANGE UP (ref 3.5–5.3)
PROT SERPL-MCNC: 7.8 GM/DL — SIGNIFICANT CHANGE UP (ref 6–8.3)
PROT UR-MCNC: 30 MG/DL
PROTHROM AB SERPL-ACNC: 11.8 SEC — SIGNIFICANT CHANGE UP (ref 10.5–13.4)
RBC # BLD: 3.4 M/UL — LOW (ref 3.8–5.2)
RBC # FLD: 15.7 % — HIGH (ref 10.3–14.5)
RBC CASTS # UR COMP ASSIST: SIGNIFICANT CHANGE UP /HPF (ref 0–4)
SARS-COV-2 RNA SPEC QL NAA+PROBE: SIGNIFICANT CHANGE UP
SODIUM SERPL-SCNC: 140 MMOL/L — SIGNIFICANT CHANGE UP (ref 135–145)
SP GR SPEC: 1.01 — SIGNIFICANT CHANGE UP (ref 1.01–1.02)
TROPONIN I, HIGH SENSITIVITY RESULT: 11.7 NG/L — SIGNIFICANT CHANGE UP
UROBILINOGEN FLD QL: 4 MG/DL
WBC # BLD: 11.79 K/UL — HIGH (ref 3.8–10.5)
WBC # FLD AUTO: 11.79 K/UL — HIGH (ref 3.8–10.5)
WBC UR QL: >50

## 2022-08-04 PROCEDURE — 71045 X-RAY EXAM CHEST 1 VIEW: CPT | Mod: 26

## 2022-08-04 PROCEDURE — 93971 EXTREMITY STUDY: CPT | Mod: 26,RT

## 2022-08-04 PROCEDURE — 93010 ELECTROCARDIOGRAM REPORT: CPT

## 2022-08-04 PROCEDURE — 71275 CT ANGIOGRAPHY CHEST: CPT | Mod: 26,MA

## 2022-08-04 PROCEDURE — 70450 CT HEAD/BRAIN W/O DYE: CPT | Mod: 26,MA

## 2022-08-04 PROCEDURE — 99285 EMERGENCY DEPT VISIT HI MDM: CPT

## 2022-08-04 RX ORDER — SODIUM CHLORIDE 9 MG/ML
1000 INJECTION INTRAMUSCULAR; INTRAVENOUS; SUBCUTANEOUS ONCE
Refills: 0 | Status: COMPLETED | OUTPATIENT
Start: 2022-08-04 | End: 2022-08-04

## 2022-08-04 RX ORDER — CIPROFLOXACIN LACTATE 400MG/40ML
400 VIAL (ML) INTRAVENOUS ONCE
Refills: 0 | Status: COMPLETED | OUTPATIENT
Start: 2022-08-04 | End: 2022-08-04

## 2022-08-04 RX ORDER — KETOROLAC TROMETHAMINE 30 MG/ML
15 SYRINGE (ML) INJECTION ONCE
Refills: 0 | Status: DISCONTINUED | OUTPATIENT
Start: 2022-08-04 | End: 2022-08-04

## 2022-08-04 RX ORDER — ACETAMINOPHEN 500 MG
650 TABLET ORAL ONCE
Refills: 0 | Status: DISCONTINUED | OUTPATIENT
Start: 2022-08-04 | End: 2022-08-04

## 2022-08-04 RX ADMIN — SODIUM CHLORIDE 1000 MILLILITER(S): 9 INJECTION INTRAMUSCULAR; INTRAVENOUS; SUBCUTANEOUS at 21:42

## 2022-08-04 RX ADMIN — SODIUM CHLORIDE 1000 MILLILITER(S): 9 INJECTION INTRAMUSCULAR; INTRAVENOUS; SUBCUTANEOUS at 20:29

## 2022-08-04 RX ADMIN — Medication 400 MILLIGRAM(S): at 21:42

## 2022-08-04 RX ADMIN — Medication 200 MILLIGRAM(S): at 20:26

## 2022-08-04 RX ADMIN — Medication 15 MILLIGRAM(S): at 22:34

## 2022-08-04 RX ADMIN — SODIUM CHLORIDE 1000 MILLILITER(S): 9 INJECTION INTRAMUSCULAR; INTRAVENOUS; SUBCUTANEOUS at 23:39

## 2022-08-04 RX ADMIN — SODIUM CHLORIDE 1000 MILLILITER(S): 9 INJECTION INTRAMUSCULAR; INTRAVENOUS; SUBCUTANEOUS at 20:27

## 2022-08-04 RX ADMIN — SODIUM CHLORIDE 1000 MILLILITER(S): 9 INJECTION INTRAMUSCULAR; INTRAVENOUS; SUBCUTANEOUS at 22:16

## 2022-08-04 RX ADMIN — SODIUM CHLORIDE 1000 MILLILITER(S): 9 INJECTION INTRAMUSCULAR; INTRAVENOUS; SUBCUTANEOUS at 18:54

## 2022-08-04 RX ADMIN — Medication 15 MILLIGRAM(S): at 23:39

## 2022-08-04 NOTE — ED ADULT NURSE NOTE - OBJECTIVE STATEMENT
Pt aaox2, bibems from home s/p syncopal episode this afternoon after urinating. Pt back home today from rehab s/p right tka on 7/14/22. In rehab, pt contracted covid, was placed on isolation, and as per family, "they did not work with her with physical therapy the way they should have been because she was quarantined". In ed, pt hypotensive, sob, diaphoretic. Stat 12-lead ekg in progress. Per daughter, pt's bp "runs low" at baseline. Pt denies cp, dizziness, n/v/d, abdominal pain, back pain, palpitations, numbness/tingling. Pt placed on 4l o2 nc with improvement in work of breathing noted.

## 2022-08-04 NOTE — ED PROVIDER NOTE - OBJECTIVE STATEMENT
82F hx HTN, Mitral valve prolapse, Arthritis, Pancreatitis, Polymyalgia rheumatica, GERD, Glaucoma, Cataract, Hyperlipidemia, Anxiety and depression, Dementia, CAD (coronary artery disease)/ s/p right TKA on 7/14, rehabilitation course complicated by patient acquiring covid-19. released from rehab this AM. patient got home, attepmted to use her walker after urinating and felt weak, sob, sat down in he wheelchair and passed out per family at bedside. was diaphoretic during this episode. now feels better. hypotensive with EMS. has been haviung some dysuria.

## 2022-08-04 NOTE — ED ADULT NURSE NOTE - NSIMPLEMENTINTERV_GEN_ALL_ED
Implemented All Fall with Harm Risk Interventions:  [place] to call system. Call bell, personal items and telephone within reach. Instruct patient to call for assistance. Room bathroom lighting operational. Non-slip footwear when patient is off stretcher. Physically safe environment: no spills, clutter or unnecessary equipment. Stretcher in lowest position, wheels locked, appropriate side rails in place. Provide visual cue, wrist band, yellow gown, etc. Monitor gait and stability. Monitor for mental status changes and reorient to person, place, and time. Review medications for side effects contributing to fall risk. Reinforce activity limits and safety measures with patient and family. Provide visual clues: red socks.

## 2022-08-04 NOTE — ED ADULT NURSE REASSESSMENT NOTE - NS ED NURSE REASSESS COMMENT FT1
Upon report from Justine Roberson, RN, pt hypotensive. Pt AAOx2. Pressure bag bolus initiated per Dr Virgen. Blood pressure improved. Pt transported to CT with RN. Daughter at bedside.

## 2022-08-04 NOTE — ED ADULT TRIAGE NOTE - CHIEF COMPLAINT QUOTE
BIBA as per EMS patient had witnessed syncopal episode, pt was lowered to ground by family. Reports R knee pain, had knee replacement on 7/14

## 2022-08-04 NOTE — ED PROVIDER NOTE - CLINICAL SUMMARY MEDICAL DECISION MAKING FREE TEXT BOX
ekg sinus tach, no obvious sings of ischemia, consider PE in setting of multiple recent risk factors, bedside US with hyperdynamic ventricles, no obvious signs of RV strain, CTA PE study ordered, did not eat/drink today as much as usual, consider dehydration though less likely. will eval for anemia, infection and metabolic patholgoies

## 2022-08-04 NOTE — ED CLERICAL - NS ED CLERK NOTE PRE-ARRIVAL INFORMATION; ADDITIONAL PRE-ARRIVAL INFORMATION
This patient is enrolled in the readmission program and has active care navigation. This patient can be followed up by the care navigation team within 24 hours. To arrange close follow-up or to obtain additional clinical information about this patient, please call the contact number above. This patient is enrolled in the comprehensive joint replacement (CJR) program and has active care navigation.  This patient can be followed up by the care navigation team within 24 hours. To arrange close follow-up or to obtain additional clinical information about this patient, please call the contact number above.   Please call the orthopedic resident for ALL patients who are admitted or placed in observation.

## 2022-08-04 NOTE — ED PROVIDER NOTE - PHYSICAL EXAMINATION
General: Awake, alert and oriented. No acute distress. Well developed, hydrated and nourished. Appears stated age.   Skin: Skin in warm, dry and intact without rashes or lesions. Appropriate color for ethnicity  HENMT: head normocephalic and atraumatic; bilateral external ears without swelling. no nasal discharge. moist oral mucosa. supple neck, trachea midline  EYES: Conjunctiva clear. nonicteric sclera. EOM intact, Eyelids are normal in appearance without swelling or lesions.  Cardiac: well perfused, s1, s2, tachcyardic  Respiratory: mildly labored breathing on NC, lungs ctab  Abdominal: nondistended, soft, nontender, nondistended  MSK: Neck and back are without deformity, visible external skin changes, or signs of trauma. Curvature of the cervical, thoracic, and lumbar spine are within normal limits. well healing vertical incision over right knee, superficial abrasion in right tibial area. no other external signs of trauma.   Neurological: The patient is awake, alert and oriented to person, place, and time with normal speech. CN 2-12 grossly intact. no apparent deficits. Memory is normal and thought process is intact. No gait abnormalities are appreciated.   Psychiatric: Appropriate mood and affect. Good judgement and insight. No visual or auditory hallucinations.

## 2022-08-04 NOTE — ED ADULT NURSE REASSESSMENT NOTE - NS ED NURSE REASSESS COMMENT FT1
Pt AAOx2. Pt sitting comfortably in bed with no complaints at this time. Respirations equal and unlabored. No acute distress noted at this time. Repeat lactate sent to lab. Family at bedside. Pending US at this time.

## 2022-08-04 NOTE — ED PROVIDER NOTE - CARE PLAN
Principal Discharge DX:	UTI (urinary tract infection)  Secondary Diagnosis:	Renal failure, acute  Secondary Diagnosis:	Syncope   1

## 2022-08-04 NOTE — ED PROVIDER NOTE - PROGRESS NOTE DETAILS
Patient care endorsed by Dr. Virgen, brought in after syncopal event following DC from rehab.  VSS, patient oxygenating well, no evidence of DVT/PE, CVA, has mild renal failure and UTI, patient on cipro, has been fluid responsive, does not appear to require ICU.  Patient is to be admitted to the hospital and the case was discussed with the admitting physician.  Any changes in plan, additional imaging/labs, and further work up will be at the discretion of the admitting physician. Per discussion with admitting physician, all necessary consults for admitted patients to be obtained by morning team unless patient requires emergent intervention or medical advice by specialist overnight.

## 2022-08-05 DIAGNOSIS — I25.10 ATHEROSCLEROTIC HEART DISEASE OF NATIVE CORONARY ARTERY WITHOUT ANGINA PECTORIS: ICD-10-CM

## 2022-08-05 DIAGNOSIS — R55 SYNCOPE AND COLLAPSE: ICD-10-CM

## 2022-08-05 DIAGNOSIS — I10 ESSENTIAL (PRIMARY) HYPERTENSION: ICD-10-CM

## 2022-08-05 DIAGNOSIS — N39.0 URINARY TRACT INFECTION, SITE NOT SPECIFIED: ICD-10-CM

## 2022-08-05 LAB
ANION GAP SERPL CALC-SCNC: 4 MMOL/L — LOW (ref 5–17)
BUN SERPL-MCNC: 32 MG/DL — HIGH (ref 7–23)
CALCIUM SERPL-MCNC: 8.1 MG/DL — LOW (ref 8.5–10.1)
CHLORIDE SERPL-SCNC: 105 MMOL/L — SIGNIFICANT CHANGE UP (ref 96–108)
CO2 SERPL-SCNC: 31 MMOL/L — SIGNIFICANT CHANGE UP (ref 22–31)
CREAT SERPL-MCNC: 1 MG/DL — SIGNIFICANT CHANGE UP (ref 0.5–1.3)
EGFR: 57 ML/MIN/1.73M2 — LOW
GLUCOSE SERPL-MCNC: 97 MG/DL — SIGNIFICANT CHANGE UP (ref 70–99)
POTASSIUM SERPL-MCNC: 3.3 MMOL/L — LOW (ref 3.5–5.3)
POTASSIUM SERPL-SCNC: 3.3 MMOL/L — LOW (ref 3.5–5.3)
SODIUM SERPL-SCNC: 140 MMOL/L — SIGNIFICANT CHANGE UP (ref 135–145)

## 2022-08-05 PROCEDURE — 99223 1ST HOSP IP/OBS HIGH 75: CPT

## 2022-08-05 RX ORDER — CEFTRIAXONE 500 MG/1
1000 INJECTION, POWDER, FOR SOLUTION INTRAMUSCULAR; INTRAVENOUS EVERY 24 HOURS
Refills: 0 | Status: COMPLETED | OUTPATIENT
Start: 2022-08-05 | End: 2022-08-07

## 2022-08-05 RX ORDER — TICAGRELOR 90 MG/1
90 TABLET ORAL EVERY 12 HOURS
Refills: 0 | Status: DISCONTINUED | OUTPATIENT
Start: 2022-08-05 | End: 2022-08-09

## 2022-08-05 RX ORDER — ENOXAPARIN SODIUM 100 MG/ML
40 INJECTION SUBCUTANEOUS EVERY 24 HOURS
Refills: 0 | Status: DISCONTINUED | OUTPATIENT
Start: 2022-08-05 | End: 2022-08-09

## 2022-08-05 RX ORDER — ONDANSETRON 8 MG/1
4 TABLET, FILM COATED ORAL EVERY 8 HOURS
Refills: 0 | Status: DISCONTINUED | OUTPATIENT
Start: 2022-08-05 | End: 2022-08-09

## 2022-08-05 RX ORDER — ATORVASTATIN CALCIUM 80 MG/1
40 TABLET, FILM COATED ORAL AT BEDTIME
Refills: 0 | Status: DISCONTINUED | OUTPATIENT
Start: 2022-08-05 | End: 2022-08-06

## 2022-08-05 RX ORDER — POTASSIUM CHLORIDE 20 MEQ
10 PACKET (EA) ORAL
Refills: 0 | Status: COMPLETED | OUTPATIENT
Start: 2022-08-05 | End: 2022-08-05

## 2022-08-05 RX ORDER — ALPRAZOLAM 0.25 MG
0.25 TABLET ORAL THREE TIMES A DAY
Refills: 0 | Status: DISCONTINUED | OUTPATIENT
Start: 2022-08-05 | End: 2022-08-09

## 2022-08-05 RX ORDER — LANOLIN ALCOHOL/MO/W.PET/CERES
3 CREAM (GRAM) TOPICAL AT BEDTIME
Refills: 0 | Status: DISCONTINUED | OUTPATIENT
Start: 2022-08-05 | End: 2022-08-09

## 2022-08-05 RX ORDER — ACETAMINOPHEN 500 MG
650 TABLET ORAL EVERY 6 HOURS
Refills: 0 | Status: DISCONTINUED | OUTPATIENT
Start: 2022-08-05 | End: 2022-08-09

## 2022-08-05 RX ORDER — DIPHENHYDRAMINE HCL 50 MG
25 CAPSULE ORAL ONCE
Refills: 0 | Status: COMPLETED | OUTPATIENT
Start: 2022-08-05 | End: 2022-08-05

## 2022-08-05 RX ORDER — HALOPERIDOL DECANOATE 100 MG/ML
2 INJECTION INTRAMUSCULAR ONCE
Refills: 0 | Status: COMPLETED | OUTPATIENT
Start: 2022-08-05 | End: 2022-08-05

## 2022-08-05 RX ORDER — DONEPEZIL HYDROCHLORIDE 10 MG/1
5 TABLET, FILM COATED ORAL AT BEDTIME
Refills: 0 | Status: DISCONTINUED | OUTPATIENT
Start: 2022-08-05 | End: 2022-08-09

## 2022-08-05 RX ORDER — ASPIRIN/CALCIUM CARB/MAGNESIUM 324 MG
81 TABLET ORAL DAILY
Refills: 0 | Status: DISCONTINUED | OUTPATIENT
Start: 2022-08-05 | End: 2022-08-09

## 2022-08-05 RX ORDER — POTASSIUM CHLORIDE 20 MEQ
20 PACKET (EA) ORAL
Refills: 0 | Status: DISCONTINUED | OUTPATIENT
Start: 2022-08-05 | End: 2022-08-05

## 2022-08-05 RX ORDER — DULOXETINE HYDROCHLORIDE 30 MG/1
60 CAPSULE, DELAYED RELEASE ORAL AT BEDTIME
Refills: 0 | Status: DISCONTINUED | OUTPATIENT
Start: 2022-08-05 | End: 2022-08-09

## 2022-08-05 RX ADMIN — DULOXETINE HYDROCHLORIDE 60 MILLIGRAM(S): 30 CAPSULE, DELAYED RELEASE ORAL at 03:35

## 2022-08-05 RX ADMIN — CEFTRIAXONE 100 MILLIGRAM(S): 500 INJECTION, POWDER, FOR SOLUTION INTRAMUSCULAR; INTRAVENOUS at 08:07

## 2022-08-05 RX ADMIN — DULOXETINE HYDROCHLORIDE 60 MILLIGRAM(S): 30 CAPSULE, DELAYED RELEASE ORAL at 21:51

## 2022-08-05 RX ADMIN — TICAGRELOR 90 MILLIGRAM(S): 90 TABLET ORAL at 06:27

## 2022-08-05 RX ADMIN — Medication 81 MILLIGRAM(S): at 01:04

## 2022-08-05 RX ADMIN — HALOPERIDOL DECANOATE 2 MILLIGRAM(S): 100 INJECTION INTRAMUSCULAR at 12:42

## 2022-08-05 RX ADMIN — ENOXAPARIN SODIUM 40 MILLIGRAM(S): 100 INJECTION SUBCUTANEOUS at 21:51

## 2022-08-05 RX ADMIN — HALOPERIDOL DECANOATE 2 MILLIGRAM(S): 100 INJECTION INTRAMUSCULAR at 13:38

## 2022-08-05 RX ADMIN — Medication 100 MILLIEQUIVALENT(S): at 14:48

## 2022-08-05 RX ADMIN — ATORVASTATIN CALCIUM 40 MILLIGRAM(S): 80 TABLET, FILM COATED ORAL at 21:51

## 2022-08-05 RX ADMIN — Medication 100 MILLIEQUIVALENT(S): at 13:58

## 2022-08-05 RX ADMIN — Medication 25 MILLIGRAM(S): at 13:35

## 2022-08-05 RX ADMIN — DONEPEZIL HYDROCHLORIDE 5 MILLIGRAM(S): 10 TABLET, FILM COATED ORAL at 21:51

## 2022-08-05 RX ADMIN — ATORVASTATIN CALCIUM 40 MILLIGRAM(S): 80 TABLET, FILM COATED ORAL at 01:03

## 2022-08-05 NOTE — CHART NOTE - NSCHARTNOTEFT_GEN_A_CORE
Second Touch    82 years old female with h/o HTN, Mitral valve prolapse, Arthritis, Pancreatitis, Polymyalgia rheumatica, GERD, Glaucoma, Cataract, Hyperlipidemia, Anxiety and depression, Dementia, CAD (coronary artery disease)/ s/p right TKA on 7/14, rehabilitation course complicated by patient acquiring covid-19. Released from rehab on the day of presentation, patient attempted to use her walker after urinating and felt weak, sob, sat down in he wheelchair and passed out per family. Hypotensive with EMS. Patient reported dysuria for 2 days.  WBC 11.79, Cr 1.59-->1, K 3.3. UA appear dirty, lactate 5.2-->1.3. CTA chest with no PE. CT head with no acute pathology. RLE DVT negative for DVT.     Assessment and plan  Acute delirium- likely due to UTI. Constant observation. Given 1 dose of haldol. Continue ceftriaxone for UTI  Syncope-? vasovagal vs orthostatic syncope. Unable to check orthostatic vital at this moment due to acute delirium  UTI- continue ceftriaxone. Follow up blood and urine culture  MACHELLE- received 3L bolus in ED. Resolved  Elevated ddimer- CTA chest with no PE, RLE doppler negative for DVT. Likely related to recent COVID infection. Repeat swab negative for COVID. Continue Lovenox for DVT prophylaxis  Hypokalemia- replaced with IV potassium. Monitor and replace serum electrolytes

## 2022-08-05 NOTE — H&P ADULT - ASSESSMENT
82F hx HTN, Mitral valve prolapse, Arthritis, Pancreatitis, Polymyalgia rheumatica, GERD, Glaucoma, Cataract, Hyperlipidemia, Anxiety and depression, Dementia, CAD (coronary artery disease)/ s/p right TKA. Episode of SOB (panic attack?) then syncope. no head stike or injury. labs with UTI and mild MACHELLE

## 2022-08-05 NOTE — H&P ADULT - NSHPLABSRESULTS_GEN_ALL_CORE
=======================================================  Labs:                        10.7   11.79 )-----------( [id number]      ( 04 Aug 2022 18:46 )             32.8     08-04    140  |  101  |  39<H>  ----------------------------<  129<H>  4.1   |  30  |  1.59<H>    Ca    9.0      04 Aug 2022 18:46    TPro  7.8  /  Alb  2.9<L>  /  TBili  0.9  /  DBili  x   /  AST  25  /  ALT  25  /  AlkPhos  136<H>  08-04      Creatinine, Serum: 1.59 mg/dL (08-04-22 @ 18:46)      D-Dimer Assay, Quantitative: 1110 ng/mL DDU (08-04-22 @ 18:46)        WBC Count: 11.79 K/uL (08-04-22 @ 18:46)    SARS-CoV-2 Result: NotDetec (08-04-22 @ 18:46)  SARS-CoV-2 Result: NotDetec (07-15-22 @ 09:55)      Alkaline Phosphatase, Serum: 136 U/L (08-04-22 @ 18:46)  Alanine Aminotransferase (ALT/SGPT): 25 U/L (08-04-22 @ 18:46)  Aspartate Aminotransferase (AST/SGOT): 25 U/L (08-04-22 @ 18:46)  Bilirubin Total, Serum: 0.9 mg/dL (08-04-22 @ 18:46)

## 2022-08-05 NOTE — H&P ADULT - NSHPREVIEWOFSYSTEMS_GEN_ALL_CORE
REVIEW OF SYSTEMS:    CONSTITUTIONAL: No weakness, fevers or chills  EYES/ENT: No visual changes;  No vertigo or throat pain hoarse voice for some time though  NECK: No pain or stiffness  RESPIRATORY: No cough, wheezing, hemoptysis; No shortness of breath  CARDIOVASCULAR: No chest pain or palpitations  GASTROINTESTINAL: No abdominal or epigastric pain. No nausea, vomiting, or hematemesis; No diarrhea or constipation. No melena or hematochezia.  GENITOURINARY: + dysuria, frequency - hematuria  NEUROLOGICAL: No numbness or weakness  SKIN: No itching, rashes

## 2022-08-05 NOTE — H&P ADULT - HISTORY OF PRESENT ILLNESS
82F hx HTN, Mitral valve prolapse, Arthritis, Pancreatitis, Polymyalgia rheumatica, GERD, Glaucoma, Cataract, Hyperlipidemia, Anxiety and depression, Dementia, CAD (coronary artery disease)/ s/p right TKA on 7/14, rehabilitation course complicated by patient acquiring covid-19. released from rehab this AM. patient got home, attempted to use her walker after urinating and felt weak, sob, sat down in he wheelchair and passed out per family at bedside. was diaphoretic during this episode. now feels better. hypotensive with EMS. has been having some dysuria.    In the ED vitals  initial 77/57 --> 105/53 Labs with WBC 11 Hgb 10.7 from 12.5 in july DDImer 1110, LA 5.2--> 1.3 BUn/Cr 39/1.59 UA + for UTI CTA neg for PE , US no DVT

## 2022-08-05 NOTE — H&P ADULT - NSHPPHYSICALEXAM_GEN_ALL_CORE
VITALS:   T(C): 37 (08-05-22 @ 04:44), Max: 37 (08-05-22 @ 04:44)  HR: 76 (08-05-22 @ 04:44) (72 - 112)  BP: 105/53 (08-05-22 @ 04:44) (77/57 - 105/53)  RR: 16 (08-05-22 @ 04:44) (14 - 22)  SpO2: 98% (08-05-22 @ 04:44) (96% - 100%)    GENERAL: NAD, lying in bed comfortably  HEAD:  Atraumatic, Normocephalic  EYES:  conjunctiva and sclera clear  ENT: Moist mucous membranes  NECK: Supple, No JVD  CHEST/LUNG: Clear to auscultation bilaterally; Unlabored respirations  HEART: Regular rate and rhythm; No murmurs, rubs, or gallops  ABDOMEN: BSx4; Soft, nondistended mild suprapubic tenderness  EXTREMITIES:  No clubbing, cyanosis, or edema  NERVOUS SYSTEM:  A&Ox3, no focal deficits moving all extremities. mild confusion during evaluation  SKIN: No  lesions

## 2022-08-06 LAB
ALBUMIN SERPL ELPH-MCNC: 2.4 G/DL — LOW (ref 3.3–5)
ALP SERPL-CCNC: 125 U/L — HIGH (ref 40–120)
ALT FLD-CCNC: 18 U/L — SIGNIFICANT CHANGE UP (ref 12–78)
ANION GAP SERPL CALC-SCNC: 7 MMOL/L — SIGNIFICANT CHANGE UP (ref 5–17)
AST SERPL-CCNC: 18 U/L — SIGNIFICANT CHANGE UP (ref 15–37)
BILIRUB SERPL-MCNC: 0.7 MG/DL — SIGNIFICANT CHANGE UP (ref 0.2–1.2)
BUN SERPL-MCNC: 23 MG/DL — SIGNIFICANT CHANGE UP (ref 7–23)
CALCIUM SERPL-MCNC: 8.3 MG/DL — LOW (ref 8.5–10.1)
CHLORIDE SERPL-SCNC: 109 MMOL/L — HIGH (ref 96–108)
CO2 SERPL-SCNC: 29 MMOL/L — SIGNIFICANT CHANGE UP (ref 22–31)
CREAT SERPL-MCNC: 0.76 MG/DL — SIGNIFICANT CHANGE UP (ref 0.5–1.3)
EGFR: 79 ML/MIN/1.73M2 — SIGNIFICANT CHANGE UP
GLUCOSE SERPL-MCNC: 86 MG/DL — SIGNIFICANT CHANGE UP (ref 70–99)
HCT VFR BLD CALC: 28.5 % — LOW (ref 34.5–45)
HGB BLD-MCNC: 8.7 G/DL — LOW (ref 11.5–15.5)
MAGNESIUM SERPL-MCNC: 2.5 MG/DL — SIGNIFICANT CHANGE UP (ref 1.6–2.6)
MCHC RBC-ENTMCNC: 29.7 PG — SIGNIFICANT CHANGE UP (ref 27–34)
MCHC RBC-ENTMCNC: 30.5 G/DL — LOW (ref 32–36)
MCV RBC AUTO: 97.3 FL — SIGNIFICANT CHANGE UP (ref 80–100)
NRBC # BLD: 0 /100 WBCS — SIGNIFICANT CHANGE UP (ref 0–0)
PHOSPHATE SERPL-MCNC: 3.2 MG/DL — SIGNIFICANT CHANGE UP (ref 2.5–4.5)
PLATELET # BLD AUTO: 589 K/UL — HIGH (ref 150–400)
POTASSIUM SERPL-MCNC: 3.7 MMOL/L — SIGNIFICANT CHANGE UP (ref 3.5–5.3)
POTASSIUM SERPL-SCNC: 3.7 MMOL/L — SIGNIFICANT CHANGE UP (ref 3.5–5.3)
PROT SERPL-MCNC: 6.1 GM/DL — SIGNIFICANT CHANGE UP (ref 6–8.3)
RBC # BLD: 2.93 M/UL — LOW (ref 3.8–5.2)
RBC # FLD: 16.2 % — HIGH (ref 10.3–14.5)
SODIUM SERPL-SCNC: 145 MMOL/L — SIGNIFICANT CHANGE UP (ref 135–145)
WBC # BLD: 10.16 K/UL — SIGNIFICANT CHANGE UP (ref 3.8–10.5)
WBC # FLD AUTO: 10.16 K/UL — SIGNIFICANT CHANGE UP (ref 3.8–10.5)

## 2022-08-06 PROCEDURE — 99233 SBSQ HOSP IP/OBS HIGH 50: CPT

## 2022-08-06 RX ORDER — ATORVASTATIN CALCIUM 80 MG/1
40 TABLET, FILM COATED ORAL AT BEDTIME
Refills: 0 | Status: DISCONTINUED | OUTPATIENT
Start: 2022-08-06 | End: 2022-08-09

## 2022-08-06 RX ADMIN — ENOXAPARIN SODIUM 40 MILLIGRAM(S): 100 INJECTION SUBCUTANEOUS at 21:17

## 2022-08-06 RX ADMIN — CEFTRIAXONE 100 MILLIGRAM(S): 500 INJECTION, POWDER, FOR SOLUTION INTRAMUSCULAR; INTRAVENOUS at 08:11

## 2022-08-06 RX ADMIN — TICAGRELOR 90 MILLIGRAM(S): 90 TABLET ORAL at 05:08

## 2022-08-06 RX ADMIN — Medication 81 MILLIGRAM(S): at 11:28

## 2022-08-06 RX ADMIN — DULOXETINE HYDROCHLORIDE 60 MILLIGRAM(S): 30 CAPSULE, DELAYED RELEASE ORAL at 21:18

## 2022-08-06 RX ADMIN — TICAGRELOR 90 MILLIGRAM(S): 90 TABLET ORAL at 17:42

## 2022-08-06 RX ADMIN — DONEPEZIL HYDROCHLORIDE 5 MILLIGRAM(S): 10 TABLET, FILM COATED ORAL at 21:18

## 2022-08-06 RX ADMIN — Medication 3 MILLIGRAM(S): at 21:18

## 2022-08-06 RX ADMIN — ATORVASTATIN CALCIUM 40 MILLIGRAM(S): 80 TABLET, FILM COATED ORAL at 21:18

## 2022-08-06 NOTE — PHYSICAL THERAPY INITIAL EVALUATION ADULT - ADDITIONAL COMMENTS
As per EMR P.T. note 7/14/22: Home set-up: lives with spouse in private house with 3 stair steps with bilateral handrails, and a ramp to enter at front. Bedroom and bathroom at same level. Bathroom is a shower-tub combination with grab rails, a standard height toilet seat, with fixed shower head.  Pt owns a rollator, commode, recliner chair and transport chair. Pt ambulated c a rollator c assist prior to R TKA. Pt had episode of syncope on the same day upon discharged home from [facility].

## 2022-08-06 NOTE — PHYSICAL THERAPY INITIAL EVALUATION ADULT - GENERAL OBSERVATIONS, REHAB EVAL
Pt is on constant observation. Pt was seen in supine c O2 at 2l/min  through nasal cannula and Primafit donned and R knee dressing C/D/I, alert and oriented to name and place. Pt is s/p RTKA 0n 7/14/22 and discharged to [facility]. Pt came from home due to fall.

## 2022-08-06 NOTE — PHYSICAL THERAPY INITIAL EVALUATION ADULT - PERTINENT HX OF CURRENT PROBLEM, REHAB EVAL
82F hx HTN, Mitral valve prolapse, Arthritis, Pancreatitis, Polymyalgia rheumatica, GERD, Glaucoma, Cataract, Hyperlipidemia, Anxiety and depression, Dementia, CAD (coronary artery disease)/ s/p right TKA on 7/14, rehabilitation course complicated by patient acquiring covid-19. Pt presents with a chief complaint of syncope at home

## 2022-08-07 LAB
-  AMIKACIN: SIGNIFICANT CHANGE UP
-  AMOXICILLIN/CLAVULANIC ACID: SIGNIFICANT CHANGE UP
-  AMPICILLIN/SULBACTAM: SIGNIFICANT CHANGE UP
-  AMPICILLIN: SIGNIFICANT CHANGE UP
-  AZTREONAM: SIGNIFICANT CHANGE UP
-  CEFAZOLIN: SIGNIFICANT CHANGE UP
-  CEFEPIME: SIGNIFICANT CHANGE UP
-  CEFOXITIN: SIGNIFICANT CHANGE UP
-  CEFTRIAXONE: SIGNIFICANT CHANGE UP
-  CIPROFLOXACIN: SIGNIFICANT CHANGE UP
-  ERTAPENEM: SIGNIFICANT CHANGE UP
-  GENTAMICIN: SIGNIFICANT CHANGE UP
-  IMIPENEM: SIGNIFICANT CHANGE UP
-  LEVOFLOXACIN: SIGNIFICANT CHANGE UP
-  MEROPENEM: SIGNIFICANT CHANGE UP
-  NITROFURANTOIN: SIGNIFICANT CHANGE UP
-  PIPERACILLIN/TAZOBACTAM: SIGNIFICANT CHANGE UP
-  TIGECYCLINE: SIGNIFICANT CHANGE UP
-  TOBRAMYCIN: SIGNIFICANT CHANGE UP
-  TRIMETHOPRIM/SULFAMETHOXAZOLE: SIGNIFICANT CHANGE UP
ANION GAP SERPL CALC-SCNC: 3 MMOL/L — LOW (ref 5–17)
BUN SERPL-MCNC: 13 MG/DL — SIGNIFICANT CHANGE UP (ref 7–23)
CALCIUM SERPL-MCNC: 8.5 MG/DL — SIGNIFICANT CHANGE UP (ref 8.5–10.1)
CHLORIDE SERPL-SCNC: 106 MMOL/L — SIGNIFICANT CHANGE UP (ref 96–108)
CO2 SERPL-SCNC: 33 MMOL/L — HIGH (ref 22–31)
CREAT SERPL-MCNC: 0.67 MG/DL — SIGNIFICANT CHANGE UP (ref 0.5–1.3)
CULTURE RESULTS: SIGNIFICANT CHANGE UP
EGFR: 88 ML/MIN/1.73M2 — SIGNIFICANT CHANGE UP
GLUCOSE SERPL-MCNC: 115 MG/DL — HIGH (ref 70–99)
HCT VFR BLD CALC: 29.4 % — LOW (ref 34.5–45)
HGB BLD-MCNC: 9.2 G/DL — LOW (ref 11.5–15.5)
MCHC RBC-ENTMCNC: 30.3 PG — SIGNIFICANT CHANGE UP (ref 27–34)
MCHC RBC-ENTMCNC: 31.3 G/DL — LOW (ref 32–36)
MCV RBC AUTO: 96.7 FL — SIGNIFICANT CHANGE UP (ref 80–100)
METHOD TYPE: SIGNIFICANT CHANGE UP
NRBC # BLD: 0 /100 WBCS — SIGNIFICANT CHANGE UP (ref 0–0)
ORGANISM # SPEC MICROSCOPIC CNT: SIGNIFICANT CHANGE UP
ORGANISM # SPEC MICROSCOPIC CNT: SIGNIFICANT CHANGE UP
PLATELET # BLD AUTO: 599 K/UL — HIGH (ref 150–400)
POTASSIUM SERPL-MCNC: 3.8 MMOL/L — SIGNIFICANT CHANGE UP (ref 3.5–5.3)
POTASSIUM SERPL-SCNC: 3.8 MMOL/L — SIGNIFICANT CHANGE UP (ref 3.5–5.3)
RBC # BLD: 3.04 M/UL — LOW (ref 3.8–5.2)
RBC # FLD: 16.2 % — HIGH (ref 10.3–14.5)
SODIUM SERPL-SCNC: 142 MMOL/L — SIGNIFICANT CHANGE UP (ref 135–145)
SPECIMEN SOURCE: SIGNIFICANT CHANGE UP
WBC # BLD: 7.27 K/UL — SIGNIFICANT CHANGE UP (ref 3.8–10.5)
WBC # FLD AUTO: 7.27 K/UL — SIGNIFICANT CHANGE UP (ref 3.8–10.5)

## 2022-08-07 PROCEDURE — 99233 SBSQ HOSP IP/OBS HIGH 50: CPT

## 2022-08-07 RX ADMIN — ATORVASTATIN CALCIUM 40 MILLIGRAM(S): 80 TABLET, FILM COATED ORAL at 22:17

## 2022-08-07 RX ADMIN — TICAGRELOR 90 MILLIGRAM(S): 90 TABLET ORAL at 17:30

## 2022-08-07 RX ADMIN — CEFTRIAXONE 100 MILLIGRAM(S): 500 INJECTION, POWDER, FOR SOLUTION INTRAMUSCULAR; INTRAVENOUS at 09:45

## 2022-08-07 RX ADMIN — DONEPEZIL HYDROCHLORIDE 5 MILLIGRAM(S): 10 TABLET, FILM COATED ORAL at 23:17

## 2022-08-07 RX ADMIN — ENOXAPARIN SODIUM 40 MILLIGRAM(S): 100 INJECTION SUBCUTANEOUS at 22:17

## 2022-08-07 RX ADMIN — Medication 81 MILLIGRAM(S): at 12:46

## 2022-08-07 RX ADMIN — DULOXETINE HYDROCHLORIDE 60 MILLIGRAM(S): 30 CAPSULE, DELAYED RELEASE ORAL at 22:17

## 2022-08-07 RX ADMIN — TICAGRELOR 90 MILLIGRAM(S): 90 TABLET ORAL at 05:36

## 2022-08-08 ENCOUNTER — TRANSCRIPTION ENCOUNTER (OUTPATIENT)
Age: 82
End: 2022-08-08

## 2022-08-08 LAB
ANION GAP SERPL CALC-SCNC: 5 MMOL/L — SIGNIFICANT CHANGE UP (ref 5–17)
BUN SERPL-MCNC: 9 MG/DL — SIGNIFICANT CHANGE UP (ref 7–23)
CALCIUM SERPL-MCNC: 8.5 MG/DL — SIGNIFICANT CHANGE UP (ref 8.5–10.1)
CHLORIDE SERPL-SCNC: 103 MMOL/L — SIGNIFICANT CHANGE UP (ref 96–108)
CO2 SERPL-SCNC: 33 MMOL/L — HIGH (ref 22–31)
CREAT SERPL-MCNC: 0.63 MG/DL — SIGNIFICANT CHANGE UP (ref 0.5–1.3)
EGFR: 90 ML/MIN/1.73M2 — SIGNIFICANT CHANGE UP
FLUAV AG NPH QL: SIGNIFICANT CHANGE UP
FLUBV AG NPH QL: SIGNIFICANT CHANGE UP
GLUCOSE SERPL-MCNC: 93 MG/DL — SIGNIFICANT CHANGE UP (ref 70–99)
HCT VFR BLD CALC: 29.2 % — LOW (ref 34.5–45)
HGB BLD-MCNC: 9.1 G/DL — LOW (ref 11.5–15.5)
MCHC RBC-ENTMCNC: 30.4 PG — SIGNIFICANT CHANGE UP (ref 27–34)
MCHC RBC-ENTMCNC: 31.2 G/DL — LOW (ref 32–36)
MCV RBC AUTO: 97.7 FL — SIGNIFICANT CHANGE UP (ref 80–100)
NRBC # BLD: 0 /100 WBCS — SIGNIFICANT CHANGE UP (ref 0–0)
PLATELET # BLD AUTO: 583 K/UL — HIGH (ref 150–400)
POTASSIUM SERPL-MCNC: 3.7 MMOL/L — SIGNIFICANT CHANGE UP (ref 3.5–5.3)
POTASSIUM SERPL-SCNC: 3.7 MMOL/L — SIGNIFICANT CHANGE UP (ref 3.5–5.3)
RBC # BLD: 2.99 M/UL — LOW (ref 3.8–5.2)
RBC # FLD: 16.2 % — HIGH (ref 10.3–14.5)
SARS-COV-2 RNA SPEC QL NAA+PROBE: SIGNIFICANT CHANGE UP
SODIUM SERPL-SCNC: 141 MMOL/L — SIGNIFICANT CHANGE UP (ref 135–145)
WBC # BLD: 6.33 K/UL — SIGNIFICANT CHANGE UP (ref 3.8–10.5)
WBC # FLD AUTO: 6.33 K/UL — SIGNIFICANT CHANGE UP (ref 3.8–10.5)

## 2022-08-08 PROCEDURE — 99233 SBSQ HOSP IP/OBS HIGH 50: CPT

## 2022-08-08 RX ADMIN — Medication 650 MILLIGRAM(S): at 16:46

## 2022-08-08 RX ADMIN — DONEPEZIL HYDROCHLORIDE 5 MILLIGRAM(S): 10 TABLET, FILM COATED ORAL at 22:00

## 2022-08-08 RX ADMIN — DULOXETINE HYDROCHLORIDE 60 MILLIGRAM(S): 30 CAPSULE, DELAYED RELEASE ORAL at 22:00

## 2022-08-08 RX ADMIN — TICAGRELOR 90 MILLIGRAM(S): 90 TABLET ORAL at 17:28

## 2022-08-08 RX ADMIN — ENOXAPARIN SODIUM 40 MILLIGRAM(S): 100 INJECTION SUBCUTANEOUS at 22:00

## 2022-08-08 RX ADMIN — Medication 650 MILLIGRAM(S): at 17:46

## 2022-08-08 RX ADMIN — ATORVASTATIN CALCIUM 40 MILLIGRAM(S): 80 TABLET, FILM COATED ORAL at 22:00

## 2022-08-08 RX ADMIN — TICAGRELOR 90 MILLIGRAM(S): 90 TABLET ORAL at 06:48

## 2022-08-08 RX ADMIN — Medication 81 MILLIGRAM(S): at 12:09

## 2022-08-08 NOTE — DIETITIAN NUTRITION RISK NOTIFICATION - TREATMENT: THE FOLLOWING DIET HAS BEEN RECOMMENDED
Diet, Regular:   Low Sodium  Supplement Feeding Modality:  Oral  Ensure Enlive Cans or Servings Per Day:  1       Frequency:  Two Times a day (08-08-22 @ 12:39) [Pending Verification By Attending]  Diet, DASH/TLC:   Sodium & Cholesterol Restricted (08-05-22 @ 00:38) [Active]

## 2022-08-08 NOTE — DIETITIAN INITIAL EVALUATION ADULT - OTHER INFO
Pt seen on medical floor , adm with acute UTI ; syncope possible 2/2 to UTI ; HTN ; CAD. Pt doesn't know what year it is. Denies N/V/D/C/Chewing/Swallowing issues, No food allergies. Reports she lives with her  PTA who does the food shopping / cooking. She wanted to know if this clinician knew her  ? and if he was still alive ? Pt w/ hx anxiety & depression ; Dementia. Consumed 0 % at breakfast this morning. She can feed herself w/ set - up w/ assistance. Pressure ulcer stage 3. NFPE performed. Pt also reports her usual body weight 180 # but can't rememberer when.

## 2022-08-08 NOTE — DISCHARGE NOTE PROVIDER - NSDCFUSCHEDAPPT_GEN_ALL_CORE_FT
[facility] Physician [facility]  [address]  Scheduled Appointment: 08/09/2022

## 2022-08-08 NOTE — DISCHARGE NOTE PROVIDER - NSDCCPCAREPLAN_GEN_ALL_CORE_FT
PRINCIPAL DISCHARGE DIAGNOSIS  Diagnosis: UTI (urinary tract infection)  Assessment and Plan of Treatment: ABT Completed      SECONDARY DISCHARGE DIAGNOSES  Diagnosis: Renal failure, acute  Assessment and Plan of Treatment: CKD, back to baseline    Diagnosis: CAD (coronary artery disease)  Assessment and Plan of Treatment: Continue Aspirin and Ticagrelor and Statin    Diagnosis: Syncope  Assessment and Plan of Treatment: Resolved

## 2022-08-08 NOTE — DISCHARGE NOTE PROVIDER - PROVIDER TOKENS
FREE:[LAST:[PCP],PHONE:[(   )    -],FAX:[(   )    -],ADDRESS:[Follow up with PCP  2 weeks after Discharge]]

## 2022-08-08 NOTE — DISCHARGE NOTE PROVIDER - HOSPITAL COURSE
82F hx HTN, Mitral valve prolapse, Arthritis, Pancreatitis, Polymyalgia rheumatica, GERD, Glaucoma, Cataract, Hyperlipidemia, Anxiety and depression, Dementia, CAD (coronary artery disease)/ s/p right TKA. Episode of SOB (panic attack?) then syncope. no head stike or injury. labs with UTI and mild MACHELLE     Problem/Plan - 1:  ·  Problem: Acute UTI.   ·  Plan: - Ceftriaxone  - FU BMP for Cr.  - PT eval is not complete for home recommendations  - d/c plan for d/c today after PT reccs    Problem/Plan - 2:  ·  Problem: Syncope.   ·  Plan: - Possible 2./2 UTI   - trop negative EKG no ischemic changes. or arrthymias.  - Will monitor.  - d/c cardiac monitor today     Problem/Plan - 3:  ·  Problem: HTN (hypertension).   ·  Plan: hctz.    Problem/Plan - 4:  ·  Problem: CAD (coronary artery disease).   ·  Plan: Lipitor 40  ASA  Brilinta   (per pt taking both despite stent being years ago).

## 2022-08-08 NOTE — DISCHARGE NOTE PROVIDER - DETAILS OF MALNUTRITION DIAGNOSIS/DIAGNOSES
This patient has been assessed with a concern for Malnutrition and was treated during this hospitalization for the following Nutrition diagnosis/diagnoses:     -  08/08/2022: Severe protein-calorie malnutrition

## 2022-08-08 NOTE — DIETITIAN INITIAL EVALUATION ADULT - NUTRITIONGOAL OUTCOME1
Pt will Meet calorie and protein needs > 50 % via meals/ supplements ; wt gain +.5/1# during Length Of Stay.

## 2022-08-08 NOTE — DIETITIAN INITIAL EVALUATION ADULT - WEIGHT FOR BMI (LBS)
Patient is a 52 y/o with no known PMHx that presents to the ED for evaluation of jaundice. Patient notes for the last few weeks he has been jaundice. He has had approximately 40lb weight loss in a few month span. He notes no preventative medical visits and no prior CRC screen. He infrequent seeks medical care due to lack of insurance. He notes his stools have been oily and occasional black. He notes no actually dysphagia, abdominal pain, nausea, emesis, fever, chills, herbal medication use, drug/ ETOH use, or recent travel. He notes his father had required stenting of his bile ducts but was not sure why ( he notes his father passed away from DM complications without a know malignant process). Of note in the ED for to be in DKA. 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 He also has a distended stomach likely from Gastroparesis/ Secondary to DKA. While obstructed no evidence of Cholangitis.     Abnormal CT ( PD dilation/ Cutoff at level of the Pancreatic Head, ascites versus infiltration)/  DKA  - Observe for upper GI symptoms , if emesis develops would place NG tube  - Correct DKA  - Will ultimately benefit from EUS/ERCP when DKA improved- would ng decompress prior to any endoscopy to prevent aspiration of stomach contents  - MRI/ MRCP for now for ductal mapping  - CEA, Ca 19-9  - Pantoprazole 40mg daily  - Will follow 135

## 2022-08-08 NOTE — DIETITIAN INITIAL EVALUATION ADULT - ETIOLOGY
Increased energy/protein needs ; inadequate energy/protein intake related to Dementia ; Anxiety & Depression Increased energy/protein needs ; inadequate energy/protein intake related to Dementia ; Anxiety & Depression ; Stage 3 Pressure ulcer

## 2022-08-08 NOTE — DIETITIAN INITIAL EVALUATION ADULT - PERTINENT LABORATORY DATA
08-08    141  |  103  |  9   ----------------------------<  93  3.7   |  33<H>  |  0.63    Ca    8.5      08 Aug 2022 09:15    A1C with Estimated Average Glucose Result: 5.8 % (06-27-22 @ 14:55)

## 2022-08-08 NOTE — DISCHARGE NOTE PROVIDER - NSDCMRMEDTOKEN_GEN_ALL_CORE_FT
ALPRAZolam 0.25 mg oral tablet: 1 tab(s) orally every 8 hours, As Needed  ascorbic acid 500 mg oral tablet: 1 tab(s) orally 2 times a day  aspirin 81 mg oral delayed release tablet: 1 tab(s) orally 2 times a day  atorvastatin 40 mg oral tablet: 1 tab(s) orally once a day  Betoptic 0.5% ophthalmic solution: 1 drop(s) to each affected eye 2 times a day  celecoxib 200 mg oral capsule: 1 cap(s) orally every 12 hours  donepezil 5 mg oral tablet: 1 tab(s) orally once a day (at bedtime)  DULoxetine 60 mg oral delayed release capsule: 1 cap(s) orally once a day (at bedtime)  hydroCHLOROthiazide 12.5 mg oral tablet: 1 tab(s) orally once a day  Multiple Vitamins oral tablet: 1 tab(s) orally once a day  polyethylene glycol 3350 oral powder for reconstitution: 17 gram(s) orally once a day (at bedtime)  senna leaf extract oral tablet: 2 tab(s) orally once a day (at bedtime)  Simbrinza 1%- 0.2% ophthalmic suspension: 1 drop(s) to each affected eye once a day  ticagrelor 90 mg oral tablet: 1 tab(s) orally 2 times a day  traMADol 50 mg oral tablet: 1 tab(s) orally every 4 hours, As needed, Moderate Pain (4 - 10)

## 2022-08-08 NOTE — DIETITIAN INITIAL EVALUATION ADULT - PERTINENT MEDS FT
MEDICATIONS  (STANDING):  aspirin enteric coated 81 milliGRAM(s) Oral daily  atorvastatin 40 milliGRAM(s) Oral at bedtime  donepezil 5 milliGRAM(s) Oral at bedtime  DULoxetine 60 milliGRAM(s) Oral at bedtime  enoxaparin Injectable 40 milliGRAM(s) SubCutaneous every 24 hours  hydrochlorothiazide Oral Tab/Cap - Peds 12.5 milliGRAM(s) Oral daily  ticagrelor 90 milliGRAM(s) Oral every 12 hours    MEDICATIONS  (PRN):  acetaminophen     Tablet .. 650 milliGRAM(s) Oral every 6 hours PRN Temp greater or equal to 38C (100.4F), Mild Pain (1 - 3)  ALPRAZolam 0.25 milliGRAM(s) Oral three times a day PRN anxiety  melatonin 3 milliGRAM(s) Oral at bedtime PRN Insomnia  ondansetron Injectable 4 milliGRAM(s) IV Push every 8 hours PRN Nausea and/or Vomiting

## 2022-08-08 NOTE — DISCHARGE NOTE PROVIDER - CARE PROVIDER_API CALL
PCP,   Follow up with PCP  2 weeks after Discharge  Phone: (   )    -  Fax: (   )    -  Follow Up Time:

## 2022-08-09 ENCOUNTER — TRANSCRIPTION ENCOUNTER (OUTPATIENT)
Age: 82
End: 2022-08-09

## 2022-08-09 ENCOUNTER — APPOINTMENT (OUTPATIENT)
Dept: ORTHOPEDIC SURGERY | Facility: CLINIC | Age: 82
End: 2022-08-09

## 2022-08-09 VITALS
HEART RATE: 99 BPM | OXYGEN SATURATION: 95 % | DIASTOLIC BLOOD PRESSURE: 63 MMHG | TEMPERATURE: 98 F | RESPIRATION RATE: 18 BRPM | SYSTOLIC BLOOD PRESSURE: 104 MMHG

## 2022-08-09 PROCEDURE — 99232 SBSQ HOSP IP/OBS MODERATE 35: CPT

## 2022-08-09 RX ORDER — TRAMADOL HYDROCHLORIDE 50 MG/1
1 TABLET ORAL
Qty: 8 | Refills: 0
Start: 2022-08-09 | End: 2022-08-10

## 2022-08-09 RX ADMIN — TICAGRELOR 90 MILLIGRAM(S): 90 TABLET ORAL at 06:38

## 2022-08-09 RX ADMIN — Medication 81 MILLIGRAM(S): at 11:52

## 2022-08-09 RX ADMIN — TICAGRELOR 90 MILLIGRAM(S): 90 TABLET ORAL at 17:23

## 2022-08-09 NOTE — PROGRESS NOTE ADULT - NUTRITIONAL ASSESSMENT
This patient has been assessed with a concern for Malnutrition and has been determined to have a diagnosis/diagnoses of Severe protein-calorie malnutrition.    This patient is being managed with:   Diet Regular-  Low Sodium  Supplement Feeding Modality:  Oral  Ensure Enlive Cans or Servings Per Day:  1       Frequency:  Two Times a day  Entered: Aug  8 2022 12:39PM

## 2022-08-09 NOTE — DISCHARGE NOTE NURSING/CASE MANAGEMENT/SOCIAL WORK - NSDCVIVACCINE_GEN_ALL_CORE_FT
influenza, injectable, quadrivalent, preservative free; 23-Sep-2014 11:50; Eryn Galeas (RN); [employer]; [id number]; IntraMuscular; Deltoid Left.; 0.5 milliLiter(s);

## 2022-08-09 NOTE — OCCUPATIONAL THERAPY INITIAL EVALUATION ADULT - RLE MMT, REHAB EVAL
Grossly equal to or greater then 3/5 hip flexion strength; Grossly less then 3/5 knee flexion strength; Grossly greater then 3/5 strength distally to knee.

## 2022-08-09 NOTE — OCCUPATIONAL THERAPY INITIAL EVALUATION ADULT - ADDITIONAL COMMENTS
As per pts son: Pt lives with spouse in private house with 3 stair steps with bilateral handrails, and a ramp to enter at front. Bedroom and bathroom at same level. Bathroom is a shower-tub combination with grab rails, a standard height toilet seat, with fixed shower head.  Pt owns a rolling walker, rollator, commode, recliner chair, tub bench and transport chair. Pt ambulates with a rollator with assist prior to R TKA. Pt had episode of syncope on the same day upon discharged home from [facility].

## 2022-08-09 NOTE — OCCUPATIONAL THERAPY INITIAL EVALUATION ADULT - GENERAL OBSERVATIONS, REHAB EVAL
Previous Accession (Optional): [id number] Previous Accession (Optional): [id number] Pt was encountered semi-supine in bed with pts family present and PTA Jose C present; NAD, AXOX3, primafit +,, followed commands, cooperative; pt had no c/o pain during session. Pt recently had a R TKR at [facility] in July. R knee dressing was clean, dry and intact.

## 2022-08-09 NOTE — PROGRESS NOTE ADULT - SUBJECTIVE AND OBJECTIVE BOX
Patient is a 82y old  Female who presents with a chief complaint of syncope at home (05 Aug 2022 04:38)    HPI:  82F hx HTN, Mitral valve prolapse, Arthritis, Pancreatitis, Polymyalgia rheumatica, GERD, Glaucoma, Cataract, Hyperlipidemia, Anxiety and depression, Dementia, CAD (coronary artery disease)/ s/p right TKA on , rehabilitation course complicated by patient acquiring covid-19. released from rehab this AM. patient got home, attempted to use her walker after urinating and felt weak, sob, sat down in he wheelchair and passed out per family at bedside. was diaphoretic during this episode. now feels better. hypotensive with EMS. has been having some dysuria.    In the ED vitals  initial 77/57 --> 105/53 Labs with WBC 11 Hgb 10.7 from 12.5 in july DDImer 1110, LA 5.2--> 1.3 BUn/Cr 39/1.59 UA + for UTI CTA neg for PE , US no DVT (05 Aug 2022 04:38)    SUBJECTIVE & OBJECTIVE: Pt seen and examined at bedside.   PHYSICAL EXAM:  T(C): 36.4 (22 @ 05:09), Max: 36.6 (22 @ 00:09)  HR: 81 (22 @ 05:09) (81 - 85)  BP: 110/62 (22 @ 05:09) (103/62 - 110/68)  RR: 20 (22 @ 05:09) (18 - 20)  SpO2: 99% (22 @ 05:09) (97% - 100%) Daily     Daily I&O's Detail    05 Aug 2022 07:01  -  06 Aug 2022 07:00  --------------------------------------------------------  IN:  Total IN: 0 mL    OUT:    Voided (mL): 1150 mL  Total OUT: 1150 mL    Total NET: -1150 mL        GENERAL: NAD, well-groomed, well-developed  HEAD:  Atraumatic, Normocephalic  EYES: EOMI, PERRLA, conjunctiva and sclera clear  ENMT: Moist mucous membranes  NECK: Supple, No JVD  NERVOUS SYSTEM:  Alert & Oriented X3, Motor Strength 5/5 B/L upper and lower extremities; DTRs 2+ intact and symmetric  CHEST/LUNG: Clear to auscultation bilaterally; No rales, rhonchi, wheezing, or rubs  HEART: Regular rate and rhythm; No murmurs, rubs, or gallops  ABDOMEN: Soft, Nontender, Nondistended; Bowel sounds present  EXTREMITIES:  2+ Peripheral Pulses, No clubbing, cyanosis, or edema  LABS:                        10.7   11.79 )-----------( 923      ( 04 Aug 2022 18:46 )             32.8   PT/INR - ( 04 Aug 2022 18:46 )   PT: 11.8 sec;   INR: 0.99 ratio         PTT - ( 04 Aug 2022 18:46 )  PTT:28.0 secUrinalysis Basic - ( 04 Aug 2022 21:45 )    Color: Yellow / Appearance: very cloudy / S.015 / pH: x  Gluc: x / Ketone: Trace  / Bili: Negative / Urobili: 4 mg/dL   Blood: x / Protein: 30 mg/dL / Nitrite: Negative   Leuk Esterase: Moderate / RBC: 0-2 /HPF / WBC >50   Sq Epi: x / Non Sq Epi: Many / Bacteria: Many    CAPILLARY BLOOD GLUCOSE        RECENT CULTURES:   RADIOLOGY & ADDITIONAL TESTS:  
Patient is a 82y old  Female who presents with a chief complaint of syncope at home (07 Aug 2022 08:05)    HPI:  82F hx HTN, Mitral valve prolapse, Arthritis, Pancreatitis, Polymyalgia rheumatica, GERD, Glaucoma, Cataract, Hyperlipidemia, Anxiety and depression, Dementia, CAD (coronary artery disease)/ s/p right TKA on 7/14, rehabilitation course complicated by patient acquiring covid-19. released from rehab this AM. patient got home, attempted to use her walker after urinating and felt weak, sob, sat down in he wheelchair and passed out per family at bedside. was diaphoretic during this episode. now feels better. hypotensive with EMS. has been having some dysuria.    In the ED vitals  initial 77/57 --> 105/53 Labs with WBC 11 Hgb 10.7 from 12.5 in july DDImer 1110, LA 5.2--> 1.3 BUn/Cr 39/1.59 UA + for UTI CTA neg for PE , US no DVT (05 Aug 2022 04:38)    SUBJECTIVE & OBJECTIVE: Pt seen and examined at bedside.   PHYSICAL EXAM:  T(C): 36.4 (08-08-22 @ 04:35), Max: 37 (08-07-22 @ 16:49)  HR: 81 (08-08-22 @ 04:35) (74 - 96)  BP: 101/64 (08-08-22 @ 04:35) (101/64 - 113/60)  RR: 18 (08-08-22 @ 04:35) (18 - 18)  SpO2: 92% (08-08-22 @ 04:35) (92% - 100%) Daily     Daily I&O's Detail    07 Aug 2022 07:01  -  08 Aug 2022 07:00  --------------------------------------------------------  IN:    IV PiggyBack: 2 mL  Total IN: 2 mL    OUT:  Total OUT: 0 mL    Total NET: 2 mL        GENERAL: NAD, well-groomed, well-developed  HEAD:  Atraumatic, Normocephalic  EYES: EOMI, PERRLA, conjunctiva and sclera clear  ENMT: Moist mucous membranes  NECK: Supple, No JVD  NERVOUS SYSTEM:  Alert & Oriented X3, Motor Strength 5/5 B/L upper and lower extremities; DTRs 2+ intact and symmetric  CHEST/LUNG: Clear to auscultation bilaterally; No rales, rhonchi, wheezing, or rubs  HEART: Regular rate and rhythm; No murmurs, rubs, or gallops  ABDOMEN: Soft, Nontender, Nondistended; Bowel sounds present  EXTREMITIES:  2+ Peripheral Pulses, No clubbing, cyanosis, or edema  LABS:                        9.2    7.27  )-----------( 599      ( 07 Aug 2022 10:15 )             29.4   CAPILLARY BLOOD GLUCOSE        RECENT CULTURES:   RADIOLOGY & ADDITIONAL TESTS:  
Patient is a 82y old  Female who presents with a chief complaint of syncope at home (06 Aug 2022 08:12)    HPI:  82F hx HTN, Mitral valve prolapse, Arthritis, Pancreatitis, Polymyalgia rheumatica, GERD, Glaucoma, Cataract, Hyperlipidemia, Anxiety and depression, Dementia, CAD (coronary artery disease)/ s/p right TKA on 7/14, rehabilitation course complicated by patient acquiring covid-19. released from rehab this AM. patient got home, attempted to use her walker after urinating and felt weak, sob, sat down in he wheelchair and passed out per family at bedside. was diaphoretic during this episode. now feels better. hypotensive with EMS. has been having some dysuria.    In the ED vitals  initial 77/57 --> 105/53 Labs with WBC 11 Hgb 10.7 from 12.5 in july DDImer 1110, LA 5.2--> 1.3 BUn/Cr 39/1.59 UA + for UTI CTA neg for PE , US no DVT (05 Aug 2022 04:38)    SUBJECTIVE & OBJECTIVE: Pt seen and examined at bedside.   PHYSICAL EXAM:  T(C): 36.4 (08-08-22 @ 04:35), Max: 37 (08-07-22 @ 16:49)  HR: 81 (08-08-22 @ 04:35) (74 - 96)  BP: 101/64 (08-08-22 @ 04:35) (101/64 - 113/60)  RR: 18 (08-08-22 @ 04:35) (18 - 18)  SpO2: 92% (08-08-22 @ 04:35) (92% - 100%) Daily     Daily I&O's Detail    07 Aug 2022 07:01  -  08 Aug 2022 07:00  --------------------------------------------------------  IN:    IV PiggyBack: 2 mL  Total IN: 2 mL    OUT:  Total OUT: 0 mL    Total NET: 2 mL        GENERAL: NAD, well-groomed, well-developed  HEAD:  Atraumatic, Normocephalic  EYES: EOMI, PERRLA, conjunctiva and sclera clear  ENMT: Moist mucous membranes  NECK: Supple, No JVD  NERVOUS SYSTEM:  Alert & Oriented X3, Motor Strength 5/5 B/L upper and lower extremities; DTRs 2+ intact and symmetric  CHEST/LUNG: Clear to auscultation bilaterally; No rales, rhonchi, wheezing, or rubs  HEART: Regular rate and rhythm; No murmurs, rubs, or gallops  ABDOMEN: Soft, Nontender, Nondistended; Bowel sounds present  EXTREMITIES:  2+ Peripheral Pulses, No clubbing, cyanosis, or edema  LABS:                        9.2    7.27  )-----------( 599      ( 07 Aug 2022 10:15 )             29.4   CAPILLARY BLOOD GLUCOSE        RECENT CULTURES:   RADIOLOGY & ADDITIONAL TESTS:  
Patient is a 82y old  Female who presents with a chief complaint of syncope at home (08 Aug 2022 14:30)    HPI:  82F hx HTN, Mitral valve prolapse, Arthritis, Pancreatitis, Polymyalgia rheumatica, GERD, Glaucoma, Cataract, Hyperlipidemia, Anxiety and depression, Dementia, CAD (coronary artery disease)/ s/p right TKA on 7/14, rehabilitation course complicated by patient acquiring covid-19. released from rehab this AM. patient got home, attempted to use her walker after urinating and felt weak, sob, sat down in he wheelchair and passed out per family at bedside. was diaphoretic during this episode. now feels better. hypotensive with EMS. has been having some dysuria.    In the ED vitals  initial 77/57 --> 105/53 Labs with WBC 11 Hgb 10.7 from 12.5 in july DDImer 1110, LA 5.2--> 1.3 BUn/Cr 39/1.59 UA + for UTI CTA neg for PE , US no DVT (05 Aug 2022 04:38)    SUBJECTIVE & OBJECTIVE: Pt seen and examined at bedside.   PHYSICAL EXAM:  T(C): 36.8 (08-09-22 @ 10:48), Max: 37.3 (08-08-22 @ 16:10)  HR: 80 (08-09-22 @ 12:02) (76 - 82)  BP: 101/68 (08-09-22 @ 12:02) (101/68 - 105/71)  RR: 16 (08-09-22 @ 13:14) (16 - 18)  SpO2: 93% (08-09-22 @ 13:14) (93% - 99%) Daily     Daily I&O's Detail    08 Aug 2022 07:01  -  09 Aug 2022 07:00  --------------------------------------------------------  IN:    Oral Fluid: 320 mL  Total IN: 320 mL    OUT:    Voided (mL): 200 mL  Total OUT: 200 mL    Total NET: 120 mL        GENERAL: thin and chronically ill appearing  HEAD:  Atraumatic, Normocephalic  EYES: EOMI, PERRLA, conjunctiva and sclera clear  ENMT: Moist mucous membranes  NECK: Supple, No JVD  NERVOUS SYSTEM:  Alert & Oriented X3, Motor Strength 5/5 B/L upper and lower extremities; DTRs 2+ intact and symmetric  CHEST/LUNG: Clear to auscultation bilaterally; No rales, rhonchi, wheezing, or rubs  HEART: Regular rate and rhythm; No murmurs, rubs, or gallops  ABDOMEN: Soft, Nontender, Nondistended; Bowel sounds present  EXTREMITIES:  right knee surgical site wnl  LABS:                        9.1    6.33  )-----------( 583      ( 08 Aug 2022 09:15 )             29.2   CAPILLARY BLOOD GLUCOSE        RECENT CULTURES:   RADIOLOGY & ADDITIONAL TESTS:

## 2022-08-09 NOTE — DISCHARGE NOTE NURSING/CASE MANAGEMENT/SOCIAL WORK - PATIENT PORTAL LINK FT
You can access the FollowMyHealth Patient Portal offered by [facility] by registering at the following website: [email or web address]. By joining [facility]’s FollowMyHealth portal, you will also be able to view your health information using other applications (apps) compatible with our system.

## 2022-08-09 NOTE — OCCUPATIONAL THERAPY INITIAL EVALUATION ADULT - PERTINENT HX OF CURRENT PROBLEM, REHAB EVAL
82F hx HTN, Mitral valve prolapse, Arthritis, Pancreatitis, Polymyalgia rheumatica, GERD, Glaucoma, Cataract, Hyperlipidemia, Anxiety and depression, Dementia, CAD (coronary artery disease)/ s/p right TKA on 7/14, rehabilitation course complicated by patient acquiring covid-19. released from rehab. Patient got home, attempted to use her walker after urinating and felt weak, sob, sat down in he wheelchair and passed out per family at bedside.

## 2022-08-09 NOTE — OCCUPATIONAL THERAPY INITIAL EVALUATION ADULT - RANGE OF MOTION EXAMINATION, LOWER EXTREMITY
RLE AROM hip flexion grossly WFL; RLE Knee flexion grossly impaired; RLE AROM distally to knee grossly WFL./Left LE Active ROM was WFL (within functional limits)/bilateral LE Passive ROM was WFL  (within functional limits)

## 2022-08-09 NOTE — PROGRESS NOTE ADULT - ASSESSMENT
82F hx HTN, Mitral valve prolapse, Arthritis, Pancreatitis, Polymyalgia rheumatica, GERD, Glaucoma, Cataract, Hyperlipidemia, Anxiety and depression, Dementia, CAD (coronary artery disease)/ s/p right TKA. Episode of SOB (panic attack?) then syncope. no head stike or injury. labs with UTI and mild MACHELLE     Problem/Plan - 1:  ·  Problem: Acute UTI.   ·  Plan: - Ceftriaxone  - FU BMP for Cr.  - PT eval is not complete for home recommendations  - d/c plan for d/c today after PT reccs    Problem/Plan - 2:  ·  Problem: Syncope.   ·  Plan: - Possible 2./2 UTI   - trop negative EKG no ischemic changes. or arrthymias.  - Will monitor.  - d/c cardiac monitor today     Problem/Plan - 3:  ·  Problem: HTN (hypertension).   ·  Plan: hctz.    Problem/Plan - 4:  ·  Problem: CAD (coronary artery disease).   ·  Plan: Lipitor 40  ASA  Brilinta   (per pt taking both despite stent being years ago).  
82F hx HTN, Mitral valve prolapse, Arthritis, Pancreatitis, Polymyalgia rheumatica, GERD, Glaucoma, Cataract, Hyperlipidemia, Anxiety and depression, Dementia, CAD (coronary artery disease)/ s/p right TKA. Episode of SOB (panic attack?) then syncope. no head stike or injury. labs with UTI and mild MACHELLE     Problem/Plan - 1:  ·  Problem: Acute UTI.   ·  Plan: - Ceftriaxone  - FU BMP for Cr.  - PT eval is not complete for home recommendations  - d/c plan for d/c today after PT reccs    Problem/Plan - 2:  ·  Problem: Syncope.   ·  Plan: - Possible 2./2 UTI   - trop negative EKG no ischemic changes. or arrthymias.  - Will monitor.  - d/c cardiac monitor today     Problem/Plan - 3:  ·  Problem: HTN (hypertension).   ·  Plan: hctz.    Problem/Plan - 4:  ·  Problem: CAD (coronary artery disease).   ·  Plan: Lipitor 40  ASA  Brilinta   (per pt taking both despite stent being years ago).  
82F hx HTN, Mitral valve prolapse, Arthritis, Pancreatitis, Polymyalgia rheumatica, GERD, Glaucoma, Cataract, Hyperlipidemia, Anxiety and depression, Dementia, CAD (coronary artery disease)/ s/p right TKA. Episode of SOB (panic attack?) then syncope. no head stike or injury. labs with UTI and mild MACHELLE     Problem/Plan - 1:  ·  Problem: Acute UTI.   ·  Plan: - Ceftriaxone  - FU BMP for Cr.  - PT eval    Problem/Plan - 2:  ·  Problem: Syncope.   ·  Plan: - Possible 2./2 UTI   - trop negative EKG no ischemic changes. or arrthymias.  - Will monitor.  - d/c cardiac monitor today     Problem/Plan - 3:  ·  Problem: HTN (hypertension).   ·  Plan: hctz.    Problem/Plan - 4:  ·  Problem: CAD (coronary artery disease).   ·  Plan: Lipitor 40  ASA  Brilinta   (per pt taking both despite stent being years ago).  
82F hx HTN, Mitral valve prolapse, Arthritis, Pancreatitis, Polymyalgia rheumatica, GERD, Glaucoma, Cataract, Hyperlipidemia, Anxiety and depression, Dementia, CAD (coronary artery disease)/ s/p right TKA. Episode of SOB (panic attack?) then syncope. no head stike or injury. labs with UTI and mild MACHELLE     Problem/Plan - 1:  ·  Problem: Acute UTI.   ·  Plan: - Ceftriaxone  - FU BMP for Cr.    Problem/Plan - 2:  ·  Problem: Syncope.   ·  Plan: - Possible 2./2 UTI   - trop negative EKG no ischemic changes. or arrthymias.  - Will monitor.    Problem/Plan - 3:  ·  Problem: HTN (hypertension).   ·  Plan: hctz.    Problem/Plan - 4:  ·  Problem: CAD (coronary artery disease).   ·  Plan: Lipitor 40  ASA  Brilinta   (per pt taking both despite stent being years ago).

## 2022-08-10 LAB
CULTURE RESULTS: SIGNIFICANT CHANGE UP
CULTURE RESULTS: SIGNIFICANT CHANGE UP
SPECIMEN SOURCE: SIGNIFICANT CHANGE UP
SPECIMEN SOURCE: SIGNIFICANT CHANGE UP

## 2022-08-15 DIAGNOSIS — Z88.0 ALLERGY STATUS TO PENICILLIN: ICD-10-CM

## 2022-08-15 DIAGNOSIS — N39.0 URINARY TRACT INFECTION, SITE NOT SPECIFIED: ICD-10-CM

## 2022-08-15 DIAGNOSIS — I25.10 ATHEROSCLEROTIC HEART DISEASE OF NATIVE CORONARY ARTERY WITHOUT ANGINA PECTORIS: ICD-10-CM

## 2022-08-15 DIAGNOSIS — Z96.651 PRESENCE OF RIGHT ARTIFICIAL KNEE JOINT: ICD-10-CM

## 2022-08-15 DIAGNOSIS — F05 DELIRIUM DUE TO KNOWN PHYSIOLOGICAL CONDITION: ICD-10-CM

## 2022-08-15 DIAGNOSIS — E43 UNSPECIFIED SEVERE PROTEIN-CALORIE MALNUTRITION: ICD-10-CM

## 2022-08-15 DIAGNOSIS — F03.90 UNSPECIFIED DEMENTIA WITHOUT BEHAVIORAL DISTURBANCE: ICD-10-CM

## 2022-08-15 DIAGNOSIS — Z90.49 ACQUIRED ABSENCE OF OTHER SPECIFIED PARTS OF DIGESTIVE TRACT: ICD-10-CM

## 2022-08-15 DIAGNOSIS — I10 ESSENTIAL (PRIMARY) HYPERTENSION: ICD-10-CM

## 2022-08-15 DIAGNOSIS — Z86.16 PERSONAL HISTORY OF COVID-19: ICD-10-CM

## 2022-08-15 DIAGNOSIS — N17.9 ACUTE KIDNEY FAILURE, UNSPECIFIED: ICD-10-CM

## 2022-08-16 ENCOUNTER — APPOINTMENT (OUTPATIENT)
Dept: ORTHOPEDIC SURGERY | Facility: CLINIC | Age: 82
End: 2022-08-16

## 2022-08-16 VITALS — HEIGHT: 67 IN | BODY MASS INDEX: 27.47 KG/M2 | WEIGHT: 175 LBS

## 2022-08-16 PROCEDURE — 99024 POSTOP FOLLOW-UP VISIT: CPT

## 2022-08-16 PROCEDURE — 73562 X-RAY EXAM OF KNEE 3: CPT | Mod: RT

## 2022-08-16 NOTE — HISTORY OF PRESENT ILLNESS
[4] : 4 [2] : 2 [] : yes [de-identified] : 8/16/22: 4.5 weeks s/p R TKA. She had been in rehab but developed a UTI and was severely dehydrated. She was admitted to the hospital for these issues. She had then caught COVID after. She has done light home therapy but continues to have stiffness. \par \par Prev doc;\par 05/03/2022: 82 year F with b/l knee pain, right is worse than the left. She was previously treated by Dr. Chau with CSI, multiple rounds of HA injections, PT,HEP and medications. Treatment provided mild temporary relief. Pain is affecting her ADL and ability to walk as she is now ambulating with a wheelchair. Interested in TKA. \par 6/28/22: continued and worsening pain in b/l knees. Scheduled for R TKA 7/14/22

## 2022-08-16 NOTE — ASSESSMENT
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 I feel she is at a significant risk and explained this with her family present due to cardiac history,and neuro risk as well as memory loss. Will see cardio and neuro in the interterm and build strength with PT. Risks and benefits discussed and all questions answered\par we will plan for surgery in August after she has been cleared ans has done some PT \par 6/28/22: continued pain in b/l knees - She is scheduled for R TKA 7/14/22. Has completed preop testing. All questions answered and risks and benefits discussed. Again discussed issues with neuro and ambulation status with patient and her family and they are aware and understand.   \par \par 8/16/22: Nearly 5wks postop. PT rx given; she is having PT coming to her house. f/up in 4 weeks and repeat XR.

## 2022-08-16 NOTE — DISCUSSION/SUMMARY
[de-identified] : The patient is doing well at this time. The patient will be started on a course of physical therapy. I recommended that the patient works on range of motion at home and was shown how to do this. I encouraged the patient to increase ambulation. The patient can continue to take Tylenol for occasional discomfort. The patient was advised not to do any dental work for the first three months following the surgery. We will see the patient  back for a follow-up for a repeat evaluation. The patient  will call or return earlier for any questions or concerns.  Signs and symptoms of infection reviewed and patient advised to call immediately for redness, fevers, and/or chills. \par \par Progress note completed by Angie Marin PA-C.\par The documentation recorded by the PA accurately reflects the service I personally performed and the decisions made by me. -Dr. Medeiros

## 2022-08-16 NOTE — PHYSICAL EXAM
[de-identified] : Incision is clean and dry with no drainage - dressing removed -\par Sensation intact\par +1 edema LE\par ROM 5-90\par \par Xray 3 views knee - Implants good position and well fixed

## 2022-09-02 ENCOUNTER — APPOINTMENT (OUTPATIENT)
Dept: OPHTHALMOLOGY | Facility: CLINIC | Age: 82
End: 2022-09-02

## 2022-09-27 ENCOUNTER — RESULT CHARGE (OUTPATIENT)
Age: 82
End: 2022-09-27

## 2022-09-27 ENCOUNTER — APPOINTMENT (OUTPATIENT)
Dept: ORTHOPEDIC SURGERY | Facility: CLINIC | Age: 82
End: 2022-09-27

## 2022-09-27 VITALS — HEIGHT: 67 IN | WEIGHT: 175 LBS | BODY MASS INDEX: 27.47 KG/M2

## 2022-09-27 DIAGNOSIS — Z96.651 PRESENCE OF RIGHT ARTIFICIAL KNEE JOINT: ICD-10-CM

## 2022-09-27 DIAGNOSIS — M17.11 UNILATERAL PRIMARY OSTEOARTHRITIS, RIGHT KNEE: ICD-10-CM

## 2022-09-27 PROCEDURE — 99024 POSTOP FOLLOW-UP VISIT: CPT

## 2022-09-27 PROCEDURE — 73562 X-RAY EXAM OF KNEE 3: CPT | Mod: RT

## 2022-09-27 NOTE — HISTORY OF PRESENT ILLNESS
[5] : 5 [0] : 0 [Dull/Aching] : dull/aching [Occasional] : occasional [Household chores] : household chores [Leisure] : leisure [Rest] : rest [Walking] : walking [de-identified] : 9/27/22: No pain at rest, 3/10 when walking.  Able to walk around the house with assistance now.  Has had limited home PT.\par \par Prev doc;\par 05/03/2022: 82 year F with b/l knee pain, right is worse than the left. She was previously treated by Dr. Chau with CSI, multiple rounds of HA injections, PT,HEP and medications. Treatment provided mild temporary relief. Pain is affecting her ADL and ability to walk as she is now ambulating with a wheelchair. Interested in TKA. \par 6/28/22: continued and worsening pain in b/l knees. Scheduled for R TKA 7/14/22\par 8/16/22: 4.5 weeks s/p R TKA. She had been in rehab but developed a UTI and was severely dehydrated. She was admitted to the hospital for these issues. She had then caught COVID after. She has done light home therapy but continues to have stiffness.  [] : no [FreeTextEntry1] : Right knee [FreeTextEntry5] : EDWARD BEST is a 82 year old F here for PO #2 for the right knee. [de-identified] : Movement [de-identified] : 07/14/2022 [de-identified] : Right knee arthroplasty

## 2022-09-27 NOTE — DISCUSSION/SUMMARY
[de-identified] : The patient was advised of the diagnosis.  The natural history of the pathology was explained in full to the patient in layman's terms. All questions were answered.  The risks and benefits of surgical and non-surgical treatment alternatives were explained in full to the patient.\par

## 2022-09-27 NOTE — IMAGING
[Right] : right knee [AP] : anteroposterior [Lateral] : lateral [Becker] : skyline [Components well fixed, in good position] : Components well fixed, in good position [de-identified] : Right knee: Inc healed.  ROM 3-90.  Stable.  No effusion.  Wheelchair.

## 2022-09-27 NOTE — ASSESSMENT
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 I feel she is at a significant risk and explained this with her family present due to cardiac history,and neuro risk as well as memory loss. Will see cardio and neuro in the interterm and build strength with PT. Risks and benefits discussed and all questions answered\par we will plan for surgery in August after she has been cleared ans has done some PT \par 6/28/22: continued pain in b/l knees - She is scheduled for R TKA 7/14/22. Has completed preop testing. All questions answered and risks and benefits discussed. Again discussed issues with neuro and ambulation status with patient and her family and they are aware and understand.   \par 8/16/22: Nearly 5wks postop. PT rx given; she is having PT coming to her house. f/up in 4 weeks and repeat XR. \par \par 9/27/22: Doing very well, cont PT for gait and strength, return at 1 year postop.

## 2022-09-30 ENCOUNTER — APPOINTMENT (OUTPATIENT)
Dept: OPHTHALMOLOGY | Facility: CLINIC | Age: 82
End: 2022-09-30

## 2023-02-10 NOTE — DISCHARGE NOTE NURSING/CASE MANAGEMENT/SOCIAL WORK - NSDCPEFALRISK_GEN_ALL_CORE
For information on Fall & Injury Prevention, visit: [email or web address] OR  [email or web address] OR  https://www.cdc.gov/steadi/patient.html
98.2

## 2023-09-21 ASSESSMENT — KOOS JR
GOING UP OR DOWN STAIRS: SEVERE
IMPORTED KOOS JR SCORE: 21.0
HOW SEVERE IS YOUR KNEE STIFFNESS AFTER FIRST WAKING IN MORNING: SEVERE
RISING FROM SITTING: SEVERE
IMPORTED FORM: YES
KOOS JR RAW SCORE: 21
IMPORTED LATERALITY: LEFT
STANDING UPRIGHT: SEVERE
STRAIGHTENING KNEE FULLY: SEVERE
BENDING TO THE FLOOR TO PICK UP OBJECT: SEVERE
TWISING OR PIVOTING ON KNEE: SEVERE

## 2024-09-17 NOTE — OCCUPATIONAL THERAPY INITIAL EVALUATION ADULT - FINE MOTOR COORDINATION, RIGHT HAND, FINGER TO NOSE, OT EVAL
Current patient location:  [place]    Is the patient currently in the state of [place]? YES    Visit mode:VIDEO    If the visit is dropped, the patient can be reconnected by: VIDEO VISIT: Text to cell phone:   Telephone Information:   Mobile [phone]       Will anyone else be joining the visit? NO  (If patient encounters technical issues they should call [phone] :[phone])    How would you like to obtain your AVS? MyChart    Are changes needed to the allergy or medication list? No    Are refills needed on medications prescribed by this physician? NO    Rooming Documentation:  Questionnaire(s) completed      Reason for visit: Video Visit and RECHECK    Joana PERSON         mild impairment